# Patient Record
Sex: MALE | Race: WHITE | Employment: STUDENT | URBAN - METROPOLITAN AREA
[De-identification: names, ages, dates, MRNs, and addresses within clinical notes are randomized per-mention and may not be internally consistent; named-entity substitution may affect disease eponyms.]

---
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
## 2023-05-22 ENCOUNTER — OFFICE VISIT (OUTPATIENT)
Dept: URGENT CARE | Age: 18
End: 2023-05-22

## 2023-05-22 VITALS
HEART RATE: 70 BPM | RESPIRATION RATE: 18 BRPM | OXYGEN SATURATION: 99 % | TEMPERATURE: 97.8 F | SYSTOLIC BLOOD PRESSURE: 160 MMHG | DIASTOLIC BLOOD PRESSURE: 90 MMHG

## 2023-05-22 DIAGNOSIS — L23.7 POISON IVY: Primary | ICD-10-CM

## 2023-05-22 RX ORDER — PREDNISONE 10 MG/1
10 TABLET ORAL DAILY
Qty: 21 TABLET | Refills: 0 | Status: SHIPPED | OUTPATIENT
Start: 2023-05-22

## 2023-05-22 NOTE — PROGRESS NOTES
[facility] [employer] [facility]        NAME: Magui Shane is a 21 y o  male  : 2005    MRN: [id number]  DATE: May 22, 2023  TIME: 8:27 AM    Assessment and Plan   Poison ivy [L23 7]  1  Poison ivy  predniSONE 10 mg tablet            Patient Instructions   Prednisone taper as directed  Cool compressed to the area  Avoid scratching   Benadryl as needed for itching- may cause drowsiness  Follow up with your PCP as needed      Follow up with PCP in 3-5 days  Proceed to  ER if symptoms worsen  Chief Complaint     Chief Complaint   Patient presents with   • Poison Ivy     Patient states that he started last Tuesday with his poison ivy- now on arms legs and back areaa         History of Present Illness       Patient is a 21year old male presenting with poison ivy rash to arms, legs, lower back, and face  He states it has been there for 6 days  He does not feel it is improving overall  He states it was on his face and caused some difficulty breathing  He treated with benadryl and Mucinex  The rash on the face has improved  No longer having breathing difficulties  Review of Systems   Review of Systems   Constitutional: Negative for activity change, chills and fever  HENT: Negative for congestion, facial swelling, sore throat and trouble swallowing  Respiratory: Negative for cough and shortness of breath  Skin: Positive for rash           Current Medications       Current Outpatient Medications:   •  predniSONE 10 mg tablet, Take 1 tablet (10 mg total) by mouth daily 60mg days 1, 50mg day 2, 40mg day 3, 30mg day 4, 20 mg day 5, 10mg day 6 , Disp: 21 tablet, Rfl: 0    Current Allergies     Allergies as of 2023   • (No Known Allergies)            The following portions of the patient's history were reviewed and updated as appropriate: allergies, current medications, past family history, past medical history, past social history, past surgical history and problem list      Past Medical History:   Diagnosis Date   • Closed right ankle fracture 2017       No past surgical history on file  No family history on file  Medications have been verified  Objective   /90 (BP Location: Right arm, Patient Position: Sitting, Cuff Size: Standard)   Pulse 70   Temp 97 8 °F (36 6 °C)   Resp 18   SpO2 99%        Physical Exam     Physical Exam  Vitals reviewed  Constitutional:       General: He is awake  He is not in acute distress  Appearance: Normal appearance  He is normal weight  Cardiovascular:      Rate and Rhythm: Normal rate  Pulmonary:      Effort: Pulmonary effort is normal       Breath sounds: Normal breath sounds  No decreased breath sounds, wheezing, rhonchi or rales  Skin:     General: Skin is warm and moist       Findings: Rash present  Comments: Diffuse vesicular rash to arms, legs, and lower back  No areas of surrounding erythema or drainage    Neurological:      Mental Status: He is alert  Psychiatric:         Behavior: Behavior is cooperative

## 2023-07-11 ENCOUNTER — OFFICE VISIT (OUTPATIENT)
Dept: URGENT CARE | Age: 18
End: 2023-07-11
Payer: COMMERCIAL

## 2023-07-11 VITALS — OXYGEN SATURATION: 99 % | HEART RATE: 86 BPM | TEMPERATURE: 98.4 F | RESPIRATION RATE: 18 BRPM

## 2023-07-11 DIAGNOSIS — L23.7 POISON IVY: Primary | ICD-10-CM

## 2023-07-11 PROCEDURE — S9083 URGENT CARE CENTER GLOBAL: HCPCS | Performed by: PHYSICIAN ASSISTANT

## 2023-07-11 PROCEDURE — 96372 THER/PROPH/DIAG INJ SC/IM: CPT | Performed by: PHYSICIAN ASSISTANT

## 2023-07-11 PROCEDURE — G0382 LEV 3 HOSP TYPE B ED VISIT: HCPCS | Performed by: PHYSICIAN ASSISTANT

## 2023-07-11 RX ORDER — PREDNISONE 10 MG/1
TABLET ORAL
Qty: 21 TABLET | Refills: 0 | Status: SHIPPED | OUTPATIENT
Start: 2023-07-11

## 2023-07-11 RX ORDER — METHYLPREDNISOLONE ACETATE 40 MG/ML
40 INJECTION, SUSPENSION INTRA-ARTICULAR; INTRALESIONAL; INTRAMUSCULAR; SOFT TISSUE ONCE
Status: COMPLETED | OUTPATIENT
Start: 2023-07-11 | End: 2023-07-11

## 2023-07-11 RX ADMIN — METHYLPREDNISOLONE ACETATE 40 MG: 40 INJECTION, SUSPENSION INTRA-ARTICULAR; INTRALESIONAL; INTRAMUSCULAR; SOFT TISSUE at 19:00

## 2023-07-11 NOTE — PATIENT INSTRUCTIONS
Take the prednisone daily as directed    Avoid heat exposure    Continue local rash care as directed    Follow-up primary care physician if symptoms persist    Go to the emergency room if your symptoms are worsening

## 2023-07-12 NOTE — PROGRESS NOTES
[facility]        NAME: Gloria Odonnell is a 21 y.o. male  : 2005    MRN: [id number]  DATE: 2023  TIME: 8:15 PM    Assessment and Plan   Poison ivy [L23.7]  1. Poison ivy  methylPREDNISolone acetate (DEPO-MEDROL) injection 40 mg    predniSONE 10 mg tablet            Patient Instructions       Follow up with PCP in 3-5 days. Proceed to  ER if symptoms worsen. Chief Complaint     Chief Complaint   Patient presents with   • Rash     Patient believes he has poison ivy. He has a rash over his entire body and states that it started Thursday. History of Present Illness       Here for evaluation of a rash on his bilateral hands, arms, face, legs. Patient was pulling weeds and was exposed to poison ivy. He states the rash is very itchy. Rash        Review of Systems   Review of Systems   Constitutional: Negative. HENT: Negative. Respiratory: Negative. Musculoskeletal: Negative. Skin: Positive for rash. Negative for color change. Current Medications       Current Outpatient Medications:   •  predniSONE 10 mg tablet, Six tablets day 1; 5 tablets day 2; 4 tablets day 3; 3 tablets day 4; 2 tablets day 5; and 1 tablet day 6, Disp: 21 tablet, Rfl: 0  •  predniSONE 10 mg tablet, Take 1 tablet (10 mg total) by mouth daily 60mg days 1, 50mg day 2, 40mg day 3, 30mg day 4, 20 mg day 5, 10mg day 6.  (Patient not taking: Reported on 2023), Disp: 21 tablet, Rfl: 0    Current Facility-Administered Medications:   •  methylPREDNISolone acetate (DEPO-MEDROL) injection 40 mg, 40 mg, Intramuscular, Once, Harish Franco PA-C    Current Allergies     Allergies as of 2023   • (No Known Allergies)            The following portions of the patient's history were reviewed and updated as appropriate: allergies, current medications, past family history, past medical history, past social history, past surgical history and problem list.     Past Medical History:   Diagnosis Date • Closed right ankle fracture 2017       No past surgical history on file. No family history on file. Medications have been verified. Objective   Pulse 86   Temp 98.4 °F (36.9 °C)   Resp 18   SpO2 99%   No LMP for male patient. Physical Exam     Physical Exam  Vitals and nursing note reviewed. Constitutional:       General: He is not in acute distress. Appearance: Normal appearance. He is well-developed. He is not ill-appearing, toxic-appearing or diaphoretic. HENT:      Head: Normocephalic and atraumatic. Eyes:      Extraocular Movements: Extraocular movements intact. Conjunctiva/sclera: Conjunctivae normal.      Pupils: Pupils are equal, round, and reactive to light. Skin:     General: Skin is warm and dry. Comments: Maculopapular vesicular rash on the bilateral hands and upper extremities as well as the lower extremities. Patient also has the similar rash on his forehead. Rash on the upper extremities does have a linear and patchy appearance consistent with poison ivy. No pustules. No deep erythema. Neurological:      General: No focal deficit present. Mental Status: He is alert and oriented to person, place, and time. Psychiatric:         Mood and Affect: Mood normal.         Behavior: Behavior normal.         Thought Content:  Thought content normal.         Judgment: Judgment normal.

## 2023-08-30 ENCOUNTER — OFFICE VISIT (OUTPATIENT)
Dept: URGENT CARE | Age: 18
End: 2023-08-30
Payer: COMMERCIAL

## 2023-08-30 VITALS
OXYGEN SATURATION: 98 % | RESPIRATION RATE: 18 BRPM | SYSTOLIC BLOOD PRESSURE: 138 MMHG | TEMPERATURE: 98.6 F | DIASTOLIC BLOOD PRESSURE: 68 MMHG

## 2023-08-30 DIAGNOSIS — L23.7 POISON IVY DERMATITIS: Primary | ICD-10-CM

## 2023-08-30 PROCEDURE — 96372 THER/PROPH/DIAG INJ SC/IM: CPT

## 2023-08-30 PROCEDURE — S9083 URGENT CARE CENTER GLOBAL: HCPCS

## 2023-08-30 PROCEDURE — G0382 LEV 3 HOSP TYPE B ED VISIT: HCPCS

## 2023-08-30 RX ORDER — PREDNISONE 50 MG/1
50 TABLET ORAL DAILY
Qty: 3 TABLET | Refills: 0 | Status: SHIPPED | OUTPATIENT
Start: 2023-08-30 | End: 2023-09-02

## 2023-08-30 RX ORDER — METHYLPREDNISOLONE SODIUM SUCCINATE 40 MG/ML
40 INJECTION, POWDER, LYOPHILIZED, FOR SOLUTION INTRAMUSCULAR; INTRAVENOUS ONCE
Status: COMPLETED | OUTPATIENT
Start: 2023-08-30 | End: 2023-08-30

## 2023-08-30 RX ADMIN — METHYLPREDNISOLONE SODIUM SUCCINATE 40 MG: 40 INJECTION, POWDER, LYOPHILIZED, FOR SOLUTION INTRAMUSCULAR; INTRAVENOUS at 17:22

## 2023-08-30 NOTE — PROGRESS NOTES
[facility]        NAME: Preeti Greenberg is a 19 y.o. male  : 2005    MRN: [id number]  DATE: 2023  TIME: 5:22 PM    Assessment and Plan   Poison ivy dermatitis [L23.7]  1. Poison ivy dermatitis  methylPREDNISolone sodium succinate (Solu-MEDROL) injection 40 mg    predniSONE 50 mg tablet        Solumedrol injection given in office. Patient tolerated well with no immediate complications. Patient Instructions     Please take Prednisone started 2023. May take Benadryl as needed for itching,   If symptoms persist, please follow-up with PCP. Proceed to  ER if symptoms worsen. Chief Complaint     Chief Complaint   Patient presents with   • Rash     Poison ivy on hands,arms, chest for 1 week. Using ivarest otc. History of Present Illness       Patient presenting for evaluation of dermatitis. Patient states that he was exposed to poison ivy over 1 week ago, and has developed a pruritic vesicular type rash over his hands and arms in the past week. He states that he has been applying Ivarest to the area with minimal relief of his symptoms. He denies any recent fevers or chills, nausea, vomiting or diarrhea. Alleviating factors of his dermatitis. Review of Systems   Review of Systems   Constitutional: Negative for chills and fever. Respiratory: Negative for shortness of breath. Cardiovascular: Negative for chest pain. Skin: Positive for rash. All other systems reviewed and are negative. Current Medications       Current Outpatient Medications:   •  predniSONE 50 mg tablet, Take 1 tablet (50 mg total) by mouth daily for 3 days, Disp: 3 tablet, Rfl: 0  •  predniSONE 10 mg tablet, Take 1 tablet (10 mg total) by mouth daily 60mg days 1, 50mg day 2, 40mg day 3, 30mg day 4, 20 mg day 5, 10mg day 6.  (Patient not taking: Reported on 2023), Disp: 21 tablet, Rfl: 0  •  predniSONE 10 mg tablet, Six tablets day 1; 5 tablets day 2; 4 tablets day 3; 3 tablets day 4; 2 tablets day 5; and 1 tablet day 6 (Patient not taking: Reported on 8/30/2023), Disp: 21 tablet, Rfl: 0    Current Facility-Administered Medications:   •  methylPREDNISolone sodium succinate (Solu-MEDROL) injection 40 mg, 40 mg, Intramuscular, Once, Myrtice Arms, CRNP    Current Allergies     Allergies as of 08/30/2023   • (No Known Allergies)            The following portions of the patient's history were reviewed and updated as appropriate: allergies, current medications, past family history, past medical history, past social history, past surgical history and problem list.     Past Medical History:   Diagnosis Date   • Closed right ankle fracture 2017       History reviewed. No pertinent surgical history. History reviewed. No pertinent family history. Medications have been verified. Objective   /68   Temp 98.6 °F (37 °C) (Temporal)   Resp 18   SpO2 98%        Physical Exam     Physical Exam  Vitals and nursing note reviewed. Constitutional:       General: He is not in acute distress. Appearance: Normal appearance. He is not ill-appearing, toxic-appearing or diaphoretic. HENT:      Head: Normocephalic and atraumatic. Eyes:      General:         Right eye: No discharge. Left eye: No discharge. Cardiovascular:      Rate and Rhythm: Normal rate and regular rhythm. Pulses: Normal pulses. Heart sounds: Normal heart sounds. No murmur heard. No friction rub. No gallop. Pulmonary:      Effort: Pulmonary effort is normal. No respiratory distress. Breath sounds: Normal breath sounds. No stridor. No wheezing, rhonchi or rales. Chest:      Chest wall: No tenderness. Skin:     General: Skin is warm and dry. Findings: Rash (vesicular type rash b/l arms, hands and fingers ) present. Neurological:      Mental Status: He is alert.    Psychiatric:         Mood and Affect: Mood normal.         Behavior: Behavior normal.

## 2023-08-30 NOTE — PATIENT INSTRUCTIONS
Please take Prednisone started 8/31/2023. May take Benadryl as needed for itching,   If symptoms persist, please follow-up with PCP.

## 2023-10-27 ENCOUNTER — OFFICE VISIT (OUTPATIENT)
Dept: INTERNAL MEDICINE CLINIC | Facility: OTHER | Age: 18
End: 2023-10-27
Payer: COMMERCIAL

## 2023-10-27 VITALS
BODY MASS INDEX: 39.17 KG/M2 | TEMPERATURE: 98.4 F | HEIGHT: 75 IN | OXYGEN SATURATION: 99 % | DIASTOLIC BLOOD PRESSURE: 80 MMHG | HEART RATE: 77 BPM | WEIGHT: 315 LBS | SYSTOLIC BLOOD PRESSURE: 116 MMHG

## 2023-10-27 DIAGNOSIS — Z13.6 ENCOUNTER FOR LIPID SCREENING FOR CARDIOVASCULAR DISEASE: ICD-10-CM

## 2023-10-27 DIAGNOSIS — Z13.220 ENCOUNTER FOR LIPID SCREENING FOR CARDIOVASCULAR DISEASE: ICD-10-CM

## 2023-10-27 DIAGNOSIS — Z23 ENCOUNTER FOR IMMUNIZATION: ICD-10-CM

## 2023-10-27 DIAGNOSIS — R30.0 DYSURIA: Primary | ICD-10-CM

## 2023-10-27 DIAGNOSIS — Z11.3 SCREEN FOR STD (SEXUALLY TRANSMITTED DISEASE): ICD-10-CM

## 2023-10-27 PROBLEM — M53.3 COCCYDYNIA: Status: RESOLVED | Noted: 2022-12-23 | Resolved: 2023-10-27

## 2023-10-27 LAB
BACTERIA UR QL AUTO: ABNORMAL /HPF
BILIRUB UR QL STRIP: NEGATIVE
CLARITY UR: CLEAR
COLOR UR: COLORLESS
GLUCOSE UR STRIP-MCNC: NEGATIVE MG/DL
HGB UR QL STRIP.AUTO: NEGATIVE
KETONES UR STRIP-MCNC: NEGATIVE MG/DL
LEUKOCYTE ESTERASE UR QL STRIP: ABNORMAL
NITRITE UR QL STRIP: NEGATIVE
NON-SQ EPI CELLS URNS QL MICRO: ABNORMAL /HPF
PH UR STRIP.AUTO: 7 [PH]
PROT UR STRIP-MCNC: NEGATIVE MG/DL
RBC #/AREA URNS AUTO: ABNORMAL /HPF
SL AMB  POCT GLUCOSE, UA: NORMAL
SL AMB LEUKOCYTE ESTERASE,UA: NORMAL
SL AMB POCT BILIRUBIN,UA: NORMAL
SL AMB POCT BLOOD,UA: NORMAL
SL AMB POCT CLARITY,UA: CLEAR
SL AMB POCT COLOR,UA: NORMAL
SL AMB POCT KETONES,UA: NORMAL
SL AMB POCT NITRITE,UA: NORMAL
SL AMB POCT PH,UA: 7
SL AMB POCT SPECIFIC GRAVITY,UA: 1.01
SL AMB POCT URINE PROTEIN: NORMAL
SL AMB POCT UROBILINOGEN: 0.2
SP GR UR STRIP.AUTO: 1.01 (ref 1–1.03)
UROBILINOGEN UR STRIP-ACNC: <2 MG/DL
WBC #/AREA URNS AUTO: ABNORMAL /HPF

## 2023-10-27 PROCEDURE — 81003 URINALYSIS AUTO W/O SCOPE: CPT | Performed by: INTERNAL MEDICINE

## 2023-10-27 PROCEDURE — 90471 IMMUNIZATION ADMIN: CPT

## 2023-10-27 PROCEDURE — 90686 IIV4 VACC NO PRSV 0.5 ML IM: CPT

## 2023-10-27 PROCEDURE — 99203 OFFICE O/P NEW LOW 30 MIN: CPT | Performed by: INTERNAL MEDICINE

## 2023-10-27 PROCEDURE — 81001 URINALYSIS AUTO W/SCOPE: CPT | Performed by: INTERNAL MEDICINE

## 2023-10-27 PROCEDURE — 87086 URINE CULTURE/COLONY COUNT: CPT | Performed by: INTERNAL MEDICINE

## 2023-10-27 PROCEDURE — 87147 CULTURE TYPE IMMUNOLOGIC: CPT | Performed by: INTERNAL MEDICINE

## 2023-10-27 NOTE — ASSESSMENT & PLAN NOTE
Urine dip negative for UTI. We will send urine for urinalysis with microscopy and reflex to culture. We will also test for gonorrhea/chlamydia as well as doing STD screening. Referral to urology given.

## 2023-10-27 NOTE — PROGRESS NOTES
Assessment/Plan:    Dysuria  Urine dip negative for UTI. We will send urine for urinalysis with microscopy and reflex to culture. We will also test for gonorrhea/chlamydia as well as doing STD screening. Referral to urology given. Diagnoses and all orders for this visit:    Dysuria  -     POCT urine dip auto non-scope  -     UA w Reflex to Microscopic w Reflex to Culture - Clinic Collect  -     Chlamydia/GC amplified DNA by PCR; Future  -     Ambulatory Referral to Urology; Future  -     CBC; Future  -     Comprehensive metabolic panel; Future    Encounter for immunization  -     influenza vaccine, quadrivalent, 0.5 mL, preservative-free, for adult and pediatric patients 6 mos+ ([place], [address] [facility], [place], [address])    Encounter for lipid screening for cardiovascular disease  -     Lipid panel; Future    Screen for STD (sexually transmitted disease)  -     HIV 1/2 AG/AB w Reflex [facility] for 2 yr old and above; Future  -     RPR-Syphilis Screening (Total Syphilis IGG/IGM); Future          BMI Counseling: Body mass index is 40 kg/m². The BMI is above normal. Nutrition recommendations include decreasing portion sizes, encouraging healthy choices of fruits and vegetables, decreasing fast food intake, consuming healthier snacks, limiting drinks that contain sugar, moderation in carbohydrate intake, increasing intake of lean protein, reducing intake of saturated and trans fat and reducing intake of cholesterol. Exercise recommendations include moderate physical activity 150 minutes/week and exercising 3-5 times per week. No pharmacotherapy was ordered. Patient referred to PCP. Rationale for BMI follow-up plan is due to patient being overweight or obese. Depression Screening and Follow-up Plan: Patient was screened for depression during today's encounter. They screened negative with a PHQ-2 score of 0. Subjective:      Patient ID: Carmelo Martinez is a 19 y.o. male.     Chief Complaint   Patient presents with   • Difficulty Urinating     Painful urination for about 3 yrs. No hematuria, no odor. Strong pain at the tip of penis. No groin pain, no lower back pain. 19year old male is seen today with concern for chronic dysuria. Symptoms are exacerbated whenever she drinks anything that isn't water. Dysuria is localized to the tip of the penis only and occurs only with urination. He denies any hematuria. She is sexually active and uses barrier protection every time. He denies any penile lesions or discharge. POC urine dip today: small amount of leukocytes. Difficulty Urinating   This is a chronic problem. The problem occurs intermittently. Pertinent negatives include no chills, hematuria, nausea or vomiting. The following portions of the patient's history were reviewed and updated as appropriate: allergies, current medications, past family history, past medical history, past social history, past surgical history, and problem list.    Review of Systems   Constitutional:  Negative for activity change, appetite change, chills, diaphoresis, fatigue and fever. HENT:  Negative for congestion, postnasal drip, rhinorrhea, sinus pressure, sinus pain, sneezing and sore throat. Eyes:  Negative for visual disturbance. Respiratory:  Negative for apnea, cough, choking, chest tightness, shortness of breath and wheezing. Cardiovascular:  Negative for chest pain, palpitations and leg swelling. Gastrointestinal:  Negative for abdominal distention, abdominal pain, anal bleeding, blood in stool, constipation, diarrhea, nausea and vomiting. Endocrine: Negative for cold intolerance and heat intolerance. Genitourinary:  Positive for dysuria. Negative for difficulty urinating and hematuria. Musculoskeletal: Negative. Skin: Negative. Neurological:  Negative for dizziness, weakness, light-headedness, numbness and headaches. Hematological:  Negative for adenopathy.    Psychiatric/Behavioral: Negative for agitation, sleep disturbance and suicidal ideas. All other systems reviewed and are negative. Past Medical History:   Diagnosis Date   • Closed right ankle fracture 2017       No current outpatient medications on file. No Known Allergies    Social History   History reviewed. No pertinent surgical history. History reviewed. No pertinent family history. Objective:  /80 (BP Location: Left arm, Patient Position: Sitting, Cuff Size: Large)   Pulse 77   Temp 98.4 °F (36.9 °C) (Temporal)   Ht 6' 3" (1.905 m)   Wt (!) 145 kg (320 lb)   SpO2 99% Comment: ra  BMI 40.00 kg/m²     Recent Results (from the past 1344 hour(s))   POCT urine dip auto non-scope    Collection Time: 10/27/23 10:45 AM   Result Value Ref Range     COLOR,UA light yellow     CLARITY,UA clear     SPECIFIC GRAVITY,UA 1.015      PH,UA 7.0     LEUKOCYTE ESTERASE,UA small     NITRITE,UA neg     GLUCOSE, UA neg     KETONES,UA neg     BILIRUBIN,UA neg     BLOOD,UA neg     POCT URINE PROTEIN neg     SL AMB POCT UROBILINOGEN 0.2             Physical Exam  Vitals and nursing note reviewed. Constitutional:       General: He is not in acute distress. Appearance: He is well-developed. He is not diaphoretic. HENT:      Head: Normocephalic and atraumatic. Eyes:      General: No scleral icterus. Right eye: No discharge. Left eye: No discharge. Conjunctiva/sclera: Conjunctivae normal.      Pupils: Pupils are equal, round, and reactive to light. Neck:      Thyroid: No thyromegaly. Vascular: No JVD. Cardiovascular:      Rate and Rhythm: Normal rate and regular rhythm. Heart sounds: Normal heart sounds. No murmur heard. No friction rub. No gallop. Pulmonary:      Effort: Pulmonary effort is normal. No respiratory distress. Breath sounds: Normal breath sounds. No wheezing or rales. Chest:      Chest wall: No tenderness.    Abdominal:      General: Bowel sounds are normal. There is no distension. Palpations: Abdomen is soft. There is no mass. Tenderness: There is no abdominal tenderness. There is no guarding or rebound. Musculoskeletal:         General: No tenderness or deformity. Normal range of motion. Cervical back: Normal range of motion and neck supple. Lymphadenopathy:      Cervical: No cervical adenopathy. Skin:     General: Skin is warm and dry. Coloration: Skin is not pale. Findings: No erythema or rash. Neurological:      Mental Status: He is alert and oriented to person, place, and time. Cranial Nerves: No cranial nerve deficit. Coordination: Coordination normal.      Deep Tendon Reflexes: Reflexes are normal and symmetric. Psychiatric:         Behavior: Behavior normal.         Thought Content:  Thought content normal.         Judgment: Judgment normal.

## 2023-10-28 LAB — BACTERIA UR CULT: ABNORMAL

## 2023-10-30 DIAGNOSIS — A49.1 GBS (GROUP B STREPTOCOCCUS) INFECTION: Primary | ICD-10-CM

## 2023-10-30 RX ORDER — AMOXICILLIN AND CLAVULANATE POTASSIUM 875; 125 MG/1; MG/1
1 TABLET, FILM COATED ORAL EVERY 12 HOURS SCHEDULED
Qty: 20 TABLET | Refills: 0 | Status: SHIPPED | OUTPATIENT
Start: 2023-10-30 | End: 2023-11-09

## 2023-11-04 ENCOUNTER — APPOINTMENT (OUTPATIENT)
Dept: LAB | Facility: IMAGING CENTER | Age: 18
End: 2023-11-04
Payer: COMMERCIAL

## 2023-11-04 DIAGNOSIS — Z11.3 SCREEN FOR STD (SEXUALLY TRANSMITTED DISEASE): ICD-10-CM

## 2023-11-04 DIAGNOSIS — R30.0 DYSURIA: ICD-10-CM

## 2023-11-04 PROCEDURE — 87389 HIV-1 AG W/HIV-1&-2 AB AG IA: CPT

## 2023-11-04 PROCEDURE — 86780 TREPONEMA PALLIDUM: CPT

## 2023-11-04 PROCEDURE — 36415 COLL VENOUS BLD VENIPUNCTURE: CPT

## 2023-11-05 LAB — TREPONEMA PALLIDUM IGG+IGM AB [PRESENCE] IN SERUM OR PLASMA BY IMMUNOASSAY: NORMAL

## 2023-11-06 ENCOUNTER — APPOINTMENT (OUTPATIENT)
Dept: LAB | Facility: IMAGING CENTER | Age: 18
End: 2023-11-06
Payer: COMMERCIAL

## 2023-11-06 DIAGNOSIS — R30.0 DYSURIA: ICD-10-CM

## 2023-11-06 LAB
HIV 1+2 AB+HIV1 P24 AG SERPL QL IA: NORMAL
HIV 2 AB SERPL QL IA: NORMAL
HIV1 AB SERPL QL IA: NORMAL
HIV1 P24 AG SERPL QL IA: NORMAL

## 2023-11-06 PROCEDURE — 87491 CHLMYD TRACH DNA AMP PROBE: CPT

## 2023-11-06 PROCEDURE — 87591 N.GONORRHOEAE DNA AMP PROB: CPT

## 2023-11-07 LAB
C TRACH DNA SPEC QL NAA+PROBE: NEGATIVE
N GONORRHOEA DNA SPEC QL NAA+PROBE: NEGATIVE

## 2024-01-29 ENCOUNTER — OFFICE VISIT (OUTPATIENT)
Dept: INTERNAL MEDICINE CLINIC | Facility: OTHER | Age: 19
End: 2024-01-29
Payer: COMMERCIAL

## 2024-01-29 VITALS
OXYGEN SATURATION: 99 % | HEIGHT: 75 IN | DIASTOLIC BLOOD PRESSURE: 70 MMHG | WEIGHT: 315 LBS | TEMPERATURE: 98 F | BODY MASS INDEX: 39.17 KG/M2 | SYSTOLIC BLOOD PRESSURE: 110 MMHG | HEART RATE: 87 BPM

## 2024-01-29 DIAGNOSIS — R31.0 GROSS HEMATURIA: ICD-10-CM

## 2024-01-29 DIAGNOSIS — Z00.00 ANNUAL PHYSICAL EXAM: Primary | ICD-10-CM

## 2024-01-29 DIAGNOSIS — R30.0 DYSURIA: ICD-10-CM

## 2024-01-29 PROCEDURE — 99395 PREV VISIT EST AGE 18-39: CPT | Performed by: INTERNAL MEDICINE

## 2024-01-29 NOTE — PROGRESS NOTES
ADULT ANNUAL PHYSICAL  [facility] [place]    NAME: Williams Calvo  AGE: 18 y.o. SEX: male  : 2005     DATE: 2024     Assessment and Plan:     Problem List Items Addressed This Visit        Genitourinary    Gross hematuria     He has an appt with urology. Will order US kidneys and bladder and UA.          Relevant Orders    US kidney and bladder    UA w Reflex to Microscopic w Reflex to Culture       Other    Dysuria    Relevant Orders    US kidney and bladder    UA w Reflex to Microscopic w Reflex to Culture    Annual physical exam - Primary     Discussed diet and exercise.   He is up to date on immunizations.   Discussed safe sex practice.             Immunizations and preventive care screenings were discussed with patient today. Appropriate education was printed on patient's after visit summary.    Counseling:  Alcohol/drug use: discussed moderation in alcohol intake, the recommendations for healthy alcohol use, and avoidance of illicit drug use.  Dental Health: discussed importance of regular tooth brushing, flossing, and dental visits.  Injury prevention: discussed safety/seat belts, safety helmets, smoke detectors, carbon dioxide detectors, and smoking near bedding or upholstery.  Sexual health: discussed sexually transmitted diseases, partner selection, use of condoms, avoidance of unintended pregnancy, and contraceptive alternatives.  Exercise: the importance of regular exercise/physical activity was discussed. Recommend exercise 3-5 times per week for at least 30 minutes.          Return in about 1 year (around 2025) for Annual physical.     Chief Complaint:     Chief Complaint   Patient presents with   • Follow-up     3 month      History of Present Illness:     Adult Annual Physical   Patient here for a comprehensive physical exam. The patient reports problems -    Dysuria: He continues to have dysuria and had an episode  of gross hematuria. The hematuria occurred after drinking alcohol and has not recurred. He never had an episode of hematuria prior or since.     Diet and Physical Activity  Diet/Nutrition: poor diet.   Exercise: no formal exercise.      Depression Screening  PHQ-2/9 Depression Screening         General Health  Sleep: sleeps well and gets 7-8 hours of sleep on average.   Hearing: normal - bilateral.  Vision: no vision problems.   Dental: no dental visits for >1 year, brushes teeth twice daily, and flosses teeth occasionally.        Health  History of STDs?: no.       Review of Systems:     Review of Systems   Constitutional:  Negative for activity change, appetite change, chills, diaphoresis, fatigue and fever.   HENT:  Negative for congestion, postnasal drip, rhinorrhea, sinus pressure, sinus pain, sneezing and sore throat.    Eyes:  Negative for visual disturbance.   Respiratory:  Negative for apnea, cough, choking, chest tightness, shortness of breath and wheezing.    Cardiovascular:  Negative for chest pain, palpitations and leg swelling.   Gastrointestinal:  Negative for abdominal distention, abdominal pain, anal bleeding, blood in stool, constipation, diarrhea, nausea and vomiting.   Endocrine: Negative for cold intolerance and heat intolerance.   Genitourinary:  Negative for difficulty urinating, dysuria and hematuria.   Musculoskeletal: Negative.    Skin: Negative.    Neurological:  Negative for dizziness, weakness, light-headedness, numbness and headaches.   Hematological:  Negative for adenopathy.   Psychiatric/Behavioral:  Negative for agitation, sleep disturbance and suicidal ideas.    All other systems reviewed and are negative.     Past Medical History:     Past Medical History:   Diagnosis Date   • Closed right ankle fracture 2017      Past Surgical History:     History reviewed. No pertinent surgical history.   Social History:     Social History     Socioeconomic History   • Marital status: Single  "    Spouse name: None   • Number of children: None   • Years of education: None   • Highest education level: None   Occupational History   • None   Tobacco Use   • Smoking status: Never   • Smokeless tobacco: Never   Vaping Use   • Vaping status: Never Used   Substance and Sexual Activity   • Alcohol use: Never   • Drug use: Never   • Sexual activity: Not Currently   Other Topics Concern   • None   Social History Narrative   • None     Social Determinants of Health     Financial Resource Strain: Not on file   Food Insecurity: Not on file   Transportation Needs: Not on file   Physical Activity: Not on file   Stress: Not on file   Social Connections: Not on file   Intimate Partner Violence: Not on file   Housing Stability: Not on file      Family History:     History reviewed. No pertinent family history.   Current Medications:     No current outpatient medications on file.     No current facility-administered medications for this visit.      Allergies:     No Known Allergies   Physical Exam:     /70 (BP Location: Left arm, Patient Position: Sitting, Cuff Size: Large)   Pulse 87   Temp 98 °F (36.7 °C) (Temporal)   Ht 6' 3\" (1.905 m)   Wt (!) 152 kg (335 lb)   SpO2 99%   BMI 41.87 kg/m²     Physical Exam  Vitals and nursing note reviewed.   Constitutional:       General: He is not in acute distress.     Appearance: Normal appearance. He is normal weight. He is not ill-appearing, toxic-appearing or diaphoretic.   HENT:      Head: Normocephalic and atraumatic.      Right Ear: Tympanic membrane, ear canal and external ear normal. There is no impacted cerumen.      Left Ear: Tympanic membrane, ear canal and external ear normal. There is no impacted cerumen.      Nose: Nose normal. No congestion or rhinorrhea.      Mouth/Throat:      Mouth: Mucous membranes are moist.      Pharynx: Oropharynx is clear. No oropharyngeal exudate or posterior oropharyngeal erythema.   Eyes:      General: No scleral icterus.        " Right eye: No discharge.         Left eye: No discharge.      Extraocular Movements: Extraocular movements intact.      Conjunctiva/sclera: Conjunctivae normal.      Pupils: Pupils are equal, round, and reactive to light.   Neck:      Vascular: No carotid bruit.   Cardiovascular:      Rate and Rhythm: Normal rate and regular rhythm.      Pulses: Normal pulses.      Heart sounds: Normal heart sounds. No murmur heard.     No friction rub. No gallop.   Pulmonary:      Effort: Pulmonary effort is normal. No respiratory distress.      Breath sounds: Normal breath sounds. No wheezing or rales.   Abdominal:      General: Abdomen is flat. Bowel sounds are normal. There is no distension.      Palpations: Abdomen is soft. There is no mass.      Tenderness: There is no abdominal tenderness. There is no guarding.      Hernia: No hernia is present.   Musculoskeletal:         General: No swelling, tenderness, deformity or signs of injury. Normal range of motion.      Cervical back: Normal range of motion and neck supple. No rigidity. No muscular tenderness.      Right lower leg: No edema.      Left lower leg: No edema.   Lymphadenopathy:      Cervical: No cervical adenopathy.   Skin:     General: Skin is warm and dry.      Capillary Refill: Capillary refill takes less than 2 seconds.      Coloration: Skin is not jaundiced or pale.      Findings: No bruising, erythema, lesion or rash.   Neurological:      General: No focal deficit present.      Mental Status: He is alert and oriented to person, place, and time. Mental status is at baseline.      Cranial Nerves: No cranial nerve deficit.      Sensory: No sensory deficit.      Motor: No weakness.      Coordination: Coordination normal.      Gait: Gait normal.      Deep Tendon Reflexes: Reflexes normal.   Psychiatric:         Mood and Affect: Mood normal.         Behavior: Behavior normal.         Thought Content: Thought content normal.         Judgment: Judgment normal.           Chi Rios MD   [facility]

## 2024-01-31 ENCOUNTER — HOSPITAL ENCOUNTER (OUTPATIENT)
Dept: RADIOLOGY | Facility: IMAGING CENTER | Age: 19
Discharge: HOME | End: 2024-01-31
Payer: COMMERCIAL

## 2024-01-31 DIAGNOSIS — R31.0 GROSS HEMATURIA: ICD-10-CM

## 2024-01-31 DIAGNOSIS — R30.0 DYSURIA: ICD-10-CM

## 2024-01-31 PROCEDURE — 76775 US EXAM ABDO BACK WALL LIM: CPT

## 2024-02-20 NOTE — PROGRESS NOTES
2/21/2024      Chief Complaint   Patient presents with    Difficulty Urinating     PT unable to give Urine sample. PT Urinated around 6:30 am PTV         Assessment and Plan    18 y.o. male -- New patient    1. Dysuria  2. Gross hematuria   - US kidney and bladder (1/31/24) showing cyst. No other  findings.   - UA unable to be obtained  - PVR today  63 mL  - Discussed conservative measures with adequate hydration, avoidance of bladder irritants, avoidance of constipation, daily cranberry, daily probiotics  - Urine cytology, BMP, CT renal protocol ordered  - Return for cystoscopy  - Call with any questions or concerns in the meantime  - All questions answered; patient understands and agrees with plan       History of Present Illness  Williams Calvo is a 18 y.o. male new patient here for evaluation of dysuria.     Denies seeing urology in the past. Patient states he has had dysuria for the past  5 years. States he also has recurrent gross hematuria at least a few times a month. US recently negative. STD testing negative in November. Denies exposures since. Urine culture in October positive for Group B strep. Denies recurrent UTI, hospitalizations, family history of  malignancies. Does have split stream.     Review of Systems   Constitutional:  Negative for activity change, appetite change, chills and fever.   HENT:  Negative for congestion and trouble swallowing.    Respiratory:  Negative for cough and shortness of breath.    Cardiovascular:  Negative for chest pain, palpitations and leg swelling.   Gastrointestinal:  Negative for abdominal pain, constipation, diarrhea, nausea and vomiting.   Genitourinary:  Negative for difficulty urinating, dysuria, flank pain, frequency, hematuria and urgency.   Musculoskeletal:  Negative for back pain and gait problem.   Skin:  Negative for wound.   Allergic/Immunologic: Negative for immunocompromised state.   Neurological:  Negative for dizziness and syncope.   Hematological:   "Does not bruise/bleed easily.   Psychiatric/Behavioral:  Negative for confusion.    All other systems reviewed and are negative.       Vitals  Vitals:    02/21/24 0813   BP: 136/88   Pulse: 81   SpO2: 98%   Weight: (!) 150 kg (330 lb)   Height: 6' 3\" (1.905 m)       Physical Exam  Constitutional:       General: He is not in acute distress.     Appearance: Normal appearance. He is not ill-appearing, toxic-appearing or diaphoretic.   HENT:      Head: Normocephalic.      Nose: No congestion.   Eyes:      General: No scleral icterus.        Right eye: No discharge.         Left eye: No discharge.      Conjunctiva/sclera: Conjunctivae normal.      Pupils: Pupils are equal, round, and reactive to light.   Pulmonary:      Effort: Pulmonary effort is normal.   Genitourinary:     Comments: JOSELIN declined  Musculoskeletal:      Cervical back: Normal range of motion.   Skin:     General: Skin is warm and dry.      Coloration: Skin is not jaundiced or pale.      Findings: No bruising, erythema, lesion or rash.   Neurological:      General: No focal deficit present.      Mental Status: He is alert and oriented to person, place, and time. Mental status is at baseline.      Gait: Gait normal.   Psychiatric:         Mood and Affect: Mood normal.         Behavior: Behavior normal.         Thought Content: Thought content normal.         Judgment: Judgment normal.           Past History  Past Medical History:   Diagnosis Date    Closed right ankle fracture 2017     Social History     Socioeconomic History    Marital status: Single     Spouse name: None    Number of children: None    Years of education: None    Highest education level: None   Occupational History    None   Tobacco Use    Smoking status: Never     Passive exposure: Never    Smokeless tobacco: Never   Vaping Use    Vaping status: Never Used   Substance and Sexual Activity    Alcohol use: Yes     Comment: Occ ONLY    Drug use: Never    Sexual activity: Yes     Partners: " "Female     Birth control/protection: Condom Male   Other Topics Concern    None   Social History Narrative    None     Social Determinants of Health     Financial Resource Strain: Not on file   Food Insecurity: Not on file   Transportation Needs: Not on file   Physical Activity: Not on file   Stress: Not on file   Social Connections: Not on file   Intimate Partner Violence: Not on file   Housing Stability: Not on file     Social History     Tobacco Use   Smoking Status Never    Passive exposure: Never   Smokeless Tobacco Never     Family History   Problem Relation Age of Onset    Diabetes Father     No Known Problems Mother     No Known Problems Sister     No Known Problems Sister        The following portions of the patient's history were reviewed and updated as appropriate: allergies, current medications, past medical history, past social history, past surgical history and problem list.    Results  Recent Results (from the past 1 hour(s))   POCT Measure PVR    Collection Time: 02/21/24  8:14 AM   Result Value Ref Range    POST-VOID RESIDUAL VOLUME, ML POC 63 mL   ]  No results found for: \"PSA\"  Lab Results   Component Value Date    CALCIUM 8.7 01/30/2018    K 3.7 01/30/2018    CO2 29 01/30/2018     01/30/2018    BUN 8 01/30/2018    CREATININE 0.58 (L) 01/30/2018     Lab Results   Component Value Date    WBC 4.34 (L) 01/30/2018    HGB 12.8 01/30/2018    HCT 40.7 01/30/2018    MCV 76 (L) 01/30/2018     01/30/2018       Natalie Daniel PA-C  "

## 2024-02-21 ENCOUNTER — APPOINTMENT (OUTPATIENT)
Dept: LAB | Facility: AMBULARY SURGERY CENTER | Age: 19
End: 2024-02-21
Payer: COMMERCIAL

## 2024-02-21 ENCOUNTER — CONSULT (OUTPATIENT)
Dept: UROLOGY | Facility: CLINIC | Age: 19
End: 2024-02-21
Payer: COMMERCIAL

## 2024-02-21 VITALS
OXYGEN SATURATION: 98 % | BODY MASS INDEX: 39.17 KG/M2 | DIASTOLIC BLOOD PRESSURE: 88 MMHG | WEIGHT: 315 LBS | HEIGHT: 75 IN | SYSTOLIC BLOOD PRESSURE: 136 MMHG | HEART RATE: 81 BPM

## 2024-02-21 DIAGNOSIS — R31.0 GROSS HEMATURIA: ICD-10-CM

## 2024-02-21 DIAGNOSIS — R30.0 DYSURIA: Primary | ICD-10-CM

## 2024-02-21 DIAGNOSIS — R30.0 DYSURIA: ICD-10-CM

## 2024-02-21 LAB
ANION GAP SERPL CALCULATED.3IONS-SCNC: 7 MMOL/L
BUN SERPL-MCNC: 10 MG/DL (ref 5–25)
CALCIUM SERPL-MCNC: 9.4 MG/DL (ref 8.4–10.2)
CHLORIDE SERPL-SCNC: 103 MMOL/L (ref 96–108)
CO2 SERPL-SCNC: 29 MMOL/L (ref 21–32)
CREAT SERPL-MCNC: 0.66 MG/DL (ref 0.6–1.3)
GFR SERPL CREATININE-BSD FRML MDRD: 139 ML/MIN/1.73SQ M
GLUCOSE P FAST SERPL-MCNC: 74 MG/DL (ref 65–99)
POST-VOID RESIDUAL VOLUME, ML POC: 63 ML
POTASSIUM SERPL-SCNC: 4.1 MMOL/L (ref 3.5–5.3)
SODIUM SERPL-SCNC: 139 MMOL/L (ref 135–147)

## 2024-02-21 PROCEDURE — 36415 COLL VENOUS BLD VENIPUNCTURE: CPT

## 2024-02-21 PROCEDURE — 51798 US URINE CAPACITY MEASURE: CPT | Performed by: PHYSICIAN ASSISTANT

## 2024-02-21 PROCEDURE — 99204 OFFICE O/P NEW MOD 45 MIN: CPT | Performed by: PHYSICIAN ASSISTANT

## 2024-02-21 PROCEDURE — 80048 BASIC METABOLIC PNL TOTAL CA: CPT

## 2024-02-28 ENCOUNTER — HOSPITAL ENCOUNTER (OUTPATIENT)
Dept: RADIOLOGY | Facility: IMAGING CENTER | Age: 19
Discharge: HOME | End: 2024-02-28
Payer: COMMERCIAL

## 2024-02-28 ENCOUNTER — APPOINTMENT (OUTPATIENT)
Dept: LAB | Facility: IMAGING CENTER | Age: 19
End: 2024-02-28
Payer: COMMERCIAL

## 2024-02-28 DIAGNOSIS — R30.0 DYSURIA: ICD-10-CM

## 2024-02-28 DIAGNOSIS — R31.0 GROSS HEMATURIA: ICD-10-CM

## 2024-02-28 LAB
BACTERIA UR QL AUTO: ABNORMAL /HPF
BILIRUB UR QL STRIP: NEGATIVE
CLARITY UR: CLEAR
COLOR UR: COLORLESS
GLUCOSE UR STRIP-MCNC: NEGATIVE MG/DL
HGB UR QL STRIP.AUTO: NEGATIVE
KETONES UR STRIP-MCNC: NEGATIVE MG/DL
LEUKOCYTE ESTERASE UR QL STRIP: ABNORMAL
MUCOUS THREADS UR QL AUTO: ABNORMAL
NITRITE UR QL STRIP: NEGATIVE
NON-SQ EPI CELLS URNS QL MICRO: ABNORMAL /HPF
PH UR STRIP.AUTO: 6.5 [PH]
PROT UR STRIP-MCNC: ABNORMAL MG/DL
RBC #/AREA URNS AUTO: ABNORMAL /HPF
SP GR UR STRIP.AUTO: 1.04 (ref 1–1.03)
UROBILINOGEN UR STRIP-ACNC: <2 MG/DL
WBC #/AREA URNS AUTO: ABNORMAL /HPF

## 2024-02-28 PROCEDURE — 87086 URINE CULTURE/COLONY COUNT: CPT

## 2024-02-28 PROCEDURE — G1004 CDSM NDSC: HCPCS

## 2024-02-28 PROCEDURE — 81001 URINALYSIS AUTO W/SCOPE: CPT

## 2024-02-28 PROCEDURE — 74178 CT ABD&PLV WO CNTR FLWD CNTR: CPT

## 2024-02-28 RX ADMIN — IOHEXOL 100 ML: 350 INJECTION, SOLUTION INTRAVENOUS at 08:34

## 2024-02-29 ENCOUNTER — TELEPHONE (OUTPATIENT)
Dept: UROLOGY | Facility: CLINIC | Age: 19
End: 2024-02-29

## 2024-02-29 NOTE — TELEPHONE ENCOUNTER
Called and spoke to the PT with results and Natalie AYALA recommendations. PT verbalized understanding.    ----- Message from Natalie Daniel PA-C sent at 2/29/2024  1:46 PM EST -----  No acute  findings. Patient to follow up with MD as scheduled for cystoscopy.

## 2024-03-01 LAB — BACTERIA UR CULT: NORMAL

## 2024-04-27 ENCOUNTER — APPOINTMENT (OUTPATIENT)
Dept: LAB | Facility: IMAGING CENTER | Age: 19
End: 2024-04-27
Payer: COMMERCIAL

## 2024-04-27 DIAGNOSIS — R30.0 DYSURIA: ICD-10-CM

## 2024-04-27 DIAGNOSIS — Z13.6 ENCOUNTER FOR LIPID SCREENING FOR CARDIOVASCULAR DISEASE: ICD-10-CM

## 2024-04-27 DIAGNOSIS — Z13.220 ENCOUNTER FOR LIPID SCREENING FOR CARDIOVASCULAR DISEASE: ICD-10-CM

## 2024-04-27 LAB
ALBUMIN SERPL BCP-MCNC: 4.3 G/DL (ref 3.5–5)
ALP SERPL-CCNC: 72 U/L (ref 34–104)
ALT SERPL W P-5'-P-CCNC: 26 U/L (ref 7–52)
ANION GAP SERPL CALCULATED.3IONS-SCNC: 7 MMOL/L (ref 4–13)
AST SERPL W P-5'-P-CCNC: 23 U/L (ref 13–39)
BILIRUB SERPL-MCNC: 0.28 MG/DL (ref 0.2–1)
BUN SERPL-MCNC: 15 MG/DL (ref 5–25)
CALCIUM SERPL-MCNC: 9.1 MG/DL (ref 8.4–10.2)
CHLORIDE SERPL-SCNC: 105 MMOL/L (ref 96–108)
CHOLEST SERPL-MCNC: 144 MG/DL
CO2 SERPL-SCNC: 27 MMOL/L (ref 21–32)
CREAT SERPL-MCNC: 0.74 MG/DL (ref 0.6–1.3)
ERYTHROCYTE [DISTWIDTH] IN BLOOD BY AUTOMATED COUNT: 14.1 % (ref 11.6–15.1)
GFR SERPL CREATININE-BSD FRML MDRD: 132 ML/MIN/1.73SQ M
GLUCOSE P FAST SERPL-MCNC: 92 MG/DL (ref 65–99)
HCT VFR BLD AUTO: 45 % (ref 36.5–49.3)
HDLC SERPL-MCNC: 48 MG/DL
HGB BLD-MCNC: 13.6 G/DL (ref 12–17)
LDLC SERPL CALC-MCNC: 86 MG/DL (ref 0–100)
MCH RBC QN AUTO: 26.5 PG (ref 26.8–34.3)
MCHC RBC AUTO-ENTMCNC: 30.2 G/DL (ref 31.4–37.4)
MCV RBC AUTO: 88 FL (ref 82–98)
NONHDLC SERPL-MCNC: 96 MG/DL
PLATELET # BLD AUTO: 336 THOUSANDS/UL (ref 149–390)
PMV BLD AUTO: 10.5 FL (ref 8.9–12.7)
POTASSIUM SERPL-SCNC: 4.2 MMOL/L (ref 3.5–5.3)
PROT SERPL-MCNC: 7.2 G/DL (ref 6.4–8.4)
RBC # BLD AUTO: 5.13 MILLION/UL (ref 3.88–5.62)
SODIUM SERPL-SCNC: 139 MMOL/L (ref 135–147)
TRIGL SERPL-MCNC: 50 MG/DL
WBC # BLD AUTO: 5.81 THOUSAND/UL (ref 4.31–10.16)

## 2024-04-27 PROCEDURE — 85027 COMPLETE CBC AUTOMATED: CPT

## 2024-04-27 PROCEDURE — 80053 COMPREHEN METABOLIC PANEL: CPT

## 2024-04-27 PROCEDURE — 80061 LIPID PANEL: CPT

## 2024-04-27 PROCEDURE — 36415 COLL VENOUS BLD VENIPUNCTURE: CPT

## 2024-05-24 ENCOUNTER — OFFICE VISIT (OUTPATIENT)
Dept: URGENT CARE | Age: 19
End: 2024-05-24
Payer: COMMERCIAL

## 2024-05-24 VITALS
SYSTOLIC BLOOD PRESSURE: 143 MMHG | TEMPERATURE: 98.2 F | HEART RATE: 69 BPM | RESPIRATION RATE: 16 BRPM | WEIGHT: 315 LBS | DIASTOLIC BLOOD PRESSURE: 94 MMHG | OXYGEN SATURATION: 99 % | HEIGHT: 75 IN | BODY MASS INDEX: 39.17 KG/M2

## 2024-05-24 DIAGNOSIS — L23.7 POISON IVY DERMATITIS: Primary | ICD-10-CM

## 2024-05-24 PROCEDURE — G0382 LEV 3 HOSP TYPE B ED VISIT: HCPCS

## 2024-05-24 PROCEDURE — S9083 URGENT CARE CENTER GLOBAL: HCPCS

## 2024-05-24 RX ORDER — PREDNISONE 10 MG/1
TABLET ORAL
Qty: 21 TABLET | Refills: 0 | Status: SHIPPED | OUTPATIENT
Start: 2024-05-24

## 2024-05-24 RX ORDER — METHYLPREDNISOLONE ACETATE 40 MG/ML
40 INJECTION, SUSPENSION INTRA-ARTICULAR; INTRALESIONAL; INTRAMUSCULAR; SOFT TISSUE ONCE
Status: COMPLETED | OUTPATIENT
Start: 2024-05-24 | End: 2024-05-24

## 2024-05-24 RX ADMIN — METHYLPREDNISOLONE ACETATE 40 MG: 40 INJECTION, SUSPENSION INTRA-ARTICULAR; INTRALESIONAL; INTRAMUSCULAR; SOFT TISSUE at 19:22

## 2024-05-24 NOTE — PATIENT INSTRUCTIONS
You were given a shot of steroids in the office due to the poison ivy rash to your genital area.    Start the prednisone taper tomorrow.  Do not take any additional motrin/ibuprofen/advil while on the steroids.  You can take additional tylenol for additional pain or fever.   Keep area clean and dry.  Monitor for signs of infection including worsening rash, fever or chills.  If you develop any of these, high prescription emergency department to be reevaluated.    Consider trial Zanfel anti-poison ivy cream as directed.

## 2024-05-24 NOTE — PROGRESS NOTES
[facility]        NAME: Williams Calvo is a 18 y.o. male  : 2005    MRN: [id number]  DATE: May 24, 2024  TIME: 7:25 PM    Assessment and Plan   Poison ivy dermatitis [L23.7]  1. Poison ivy dermatitis  predniSONE 10 mg tablet    methylPREDNISolone acetate (DEPO-MEDROL) injection 40 mg        You were given a shot of steroids in the office due to the poison ivy rash to your genital area.    Start the prednisone taper tomorrow.  Do not take any additional motrin/ibuprofen/advil while on the steroids.  You can take additional tylenol for additional pain or fever.   Keep area clean and dry.  Monitor for signs of infection including worsening rash, fever or chills.  If you develop any of these, high prescription emergency department to be reevaluated.    Consider trial Zanfel anti-poison ivy cream as directed.     Patient Instructions       Follow up with PCP in 3-5 days.  Proceed to  ER if symptoms worsen.    If tests have been performed at [facility], our office will contact you with results if changes need to be made to the care plan discussed with you at the visit.  You can review your full results on [facility].    Chief Complaint     Chief Complaint   Patient presents with   • Poison Ivy     Per patient this started on Monday. He has poison ivy on his bilateral arms, chest and genitals.          History of Present Illness       Is a 18-year-old male presenting with poison ivy rash to bilateral upper arms, chest, and genital area.  He reports working as a , with frequent exposures to poison ivy.  He states that he used over-the-counter hydrocortisone cream without relief.  Concerns today due to poison ivy in the groin area.    Poison Ivy        Review of Systems   Review of Systems   Skin:  Positive for rash.         Current Medications       Current Outpatient Medications:   •  predniSONE 10 mg tablet, 6 Tabs day 1, 5 tabs day 2, 4 tabs day 3, 3 tabs day 4, 2 tabs day 5, 1 tab day 6,  "Disp: 21 tablet, Rfl: 0    Current Facility-Administered Medications:   •  methylPREDNISolone acetate (DEPO-MEDROL) injection 40 mg, 40 mg, Intramuscular, Once,     Current Allergies     Allergies as of 05/24/2024   • (No Known Allergies)            The following portions of the patient's history were reviewed and updated as appropriate: allergies, current medications, past family history, past medical history, past social history, past surgical history and problem list.     Past Medical History:   Diagnosis Date   • Closed right ankle fracture 2017       History reviewed. No pertinent surgical history.    Family History   Problem Relation Age of Onset   • Diabetes Father    • No Known Problems Mother    • No Known Problems Sister    • No Known Problems Sister          Medications have been verified.        Objective   /94 (BP Location: Left arm, Patient Position: Sitting)   Pulse 69   Temp 98.2 °F (36.8 °C) (Skin)   Resp 16   Ht 6' 3\" (1.905 m)   Wt (!) 143 kg (315 lb)   SpO2 99%   BMI 39.37 kg/m²   No LMP for male patient.       Physical Exam     Physical Exam  Vitals and nursing note reviewed.   Constitutional:       General: He is not in acute distress.     Appearance: Normal appearance. He is obese. He is not ill-appearing.   HENT:      Head: Normocephalic.   Cardiovascular:      Rate and Rhythm: Normal rate.      Pulses: Normal pulses.   Pulmonary:      Effort: Pulmonary effort is normal.   Skin:     General: Skin is warm and dry.      Findings: Rash present. Rash is macular and pustular.   Neurological:      Mental Status: He is alert.                   "

## 2024-06-15 ENCOUNTER — AMB VIDEO VISIT (OUTPATIENT)
Dept: OTHER | Facility: HOSPITAL | Age: 19
End: 2024-06-15
Payer: COMMERCIAL

## 2024-06-15 ENCOUNTER — AMB VIDEO VISIT (OUTPATIENT)
Dept: OTHER | Facility: HOSPITAL | Age: 19
End: 2024-06-15

## 2024-06-15 DIAGNOSIS — L23.7 POISON IVY DERMATITIS: Primary | ICD-10-CM

## 2024-06-15 PROCEDURE — 99212 OFFICE O/P EST SF 10 MIN: CPT | Performed by: PHYSICIAN ASSISTANT

## 2024-06-15 RX ORDER — PREDNISONE 10 MG/1
TABLET ORAL
Qty: 21 TABLET | Refills: 0 | Status: SHIPPED | OUTPATIENT
Start: 2024-06-15

## 2024-06-15 NOTE — CARE ANYWHERE EVISITS
Visit Summary for MITUL ROTHMAN - Gender: Male - Date of Birth: 2005  Date: [id number] - Duration: 4 minutes  Patient: MITUL ROTHMAN  Provider: Carolina Sequeira PA-C    Patient Contact Information  Address  [address]  [place]; [place] [address]  [phone]    Visit Topics  Poison  [Added By: Self - 2024-06-15]    Triage Questions   What is your current physical address in the event of a medical emergency? Answer []  Are you allergic to any medications? Answer []  Are you now or could you be pregnant? Answer []  Do you have any immune system compromise or chronic lung   disease? Answer []  Do you have any vulnerable family members in the home (infant, pregnant, cancer, elderly)? Answer []     Conversation Transcripts  [0A][0A] [Notification] You are connected with Carolina Sequeira PA-C, Urgent Care Specialist.[0A][Notification] MITUL ROTHMAN is located in [place].[0A][Notification] MITUL ROTHMAN has shared health history...[0A]    Diagnosis  Allergic contact dermatitis, unspecified cause    Procedures  Value: [id number] Code: CPT-4 UNLISTED E&M SERVICE    Medications Prescribed    No prescriptions ordered    Electronically signed by: Carolina Sequeira PA-C(NPI [id number])

## 2024-06-16 NOTE — PROGRESS NOTES
"Required Documentation:  Encounter provider: Carolina Sequeira PA-C    Identify all parties in room with patient during virtual visit:  No one else    The patient was identified by name and date of birth. Williams Calvo was informed that this is a telemedicine visit and that the visit is being conducted through the [employer] platform. He agrees to proceed..  My office door was closed. No one else was in the room.  He acknowledged consent and understanding of privacy and security of the video platform. The patient has agreed to participate and understands they can discontinue the visit at any time.    Verification of patient location:  Patient is located at Home in the following state in which I hold an active license [place]    Patient is aware this is a billable service.     Reason for visit is No chief complaint on file.       Subjective  18 y.o. male presents for evaluation of \"poison Ivy\" notes on b/l hands and arms. Notes he works as a  and has had poison ivy many times typically requires long prednisone taper to clear it. Denies fever, chills, N/V//d, lip, face or tongue swelling, no leision on face or neck as of yet.            Past Medical History:   Diagnosis Date    Closed right ankle fracture 2017       No past surgical history on file.     No Known Allergies    Review of Systems   Skin:  Positive for rash.   All other systems reviewed and are negative.      Video Exam    There were no vitals filed for this visit.    Physical Exam  Constitutional:       General: He is not in acute distress.     Appearance: Normal appearance. He is not ill-appearing or toxic-appearing.   HENT:      Head: Normocephalic and atraumatic.      Right Ear: External ear normal.      Left Ear: External ear normal.      Nose: Nose normal.   Eyes:      Extraocular Movements: Extraocular movements intact.      Conjunctiva/sclera: Conjunctivae normal.   Pulmonary:      Effort: Pulmonary effort is normal. No respiratory " distress.      Breath sounds: No stridor. No wheezing.   Skin:     Findings: Rash present. Rash is papular and vesicular.             Comments: Rash b/l hands and wrists consistent with poison ivy dermatitis.   Neurological:      General: No focal deficit present.      Mental Status: He is alert and oriented to person, place, and time. Mental status is at baseline.   Psychiatric:         Mood and Affect: Mood normal.         Behavior: Behavior normal.         Thought Content: Thought content normal.         Judgment: Judgment normal.         Visit Time  Total Visit Duration: 9 minutes    Assessment/Plan:    Diagnoses and all orders for this visit:    Poison ivy dermatitis  -     predniSONE 10 mg tablet; 6 Tabs day 1, 5 tabs day 2, 4 tabs day 3, 3 tabs day 4, 2 tabs day 5, 1 tab day 6        Patient Instructions   Continue supportive care including but not limited to Bendaryl as needed for itching, daily allergy medication such as zyrtec, allegra, Claritin, Xyzal, take all medication as directed.   Follow up with PCP in 2-3 days if symptoms not improving. Go to ER if worsening symptoms or development of facial/lip/tongue or eye swelling, shortness of breath, wheezing, chest pain, difficulty swallowing.

## 2024-06-16 NOTE — PATIENT INSTRUCTIONS
Continue supportive care including but not limited to Bendaryl as needed for itching, daily allergy medication such as zyrtec, allegra, Claritin, Xyzal, take all medication as directed.   Follow up with PCP in 2-3 days if symptoms not improving. Go to ER if worsening symptoms or development of facial/lip/tongue or eye swelling, shortness of breath, wheezing, chest pain, difficulty swallowing.

## 2024-06-18 NOTE — PROGRESS NOTES
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  We discussed the risk for recurrence and worsening.  His current stricture disease is unlikely to be playing a role in his ability to empty his bladder although he did have some mild trabeculations on cystoscopy and PVR was 60 cc at last appointment.    We discussed options including the role for balloon dilation which I think has more risk than benefits.  Recommend observation with plan for repeat cystoscopy within 1 to 2 years.    Gross hematuria  Does not have malignancy based on CT scan and cystoscopy.  Urine cytology was unable to be obtained because the patient could not void before cystoscopy and then voided into the toilet after.    Dysuria  Unclear why the patient has dysuria but very likely related to his inflammatory stricture appearing disease process given his persistent pyuria.  This suggest perhaps a role for infection but his urine cultures have been contaminated or grown group B strep.  We discussed options for observation versus a empiric course of antibiotics for about 1 month with doxycycline.  I think this is low yield but reasonable.  We also discussed the role for a neuropathic pain medication which I do not think is indicated based on current symptoms.  After conversation the patient wants to observe.  We will see him back in 6 to 9 months to reassess how he is doing.  Would also obtain urinalysis and urine culture at that time.  If he is continuing to have similar issues and a repeat urinalysis at that time shows continued pyuria (Even with negative urine cx) then I do think we should more strongly consider a course of empiric antibiotics.          Subjective:      Patient ID: Williams Calvo is a 18 y.o. male.    HPI  Williams Calvo is a 18 y.o. male new patient here for evaluation of dysuria.       Denies seeing urology in the past. Patient states he has had dysuria for the past 5 years which occurs intermittently more likely when he is sitting than when he is standing.  Does not seem to be related to fluid intake although the patient reports he is always drinking a large quantity of water because his work keeps him outside.  He denies any significant difficulties voiding although he sometimes has a split stream.  PVR 63 cc.    He also reports he has had recurrent gross hematuria at least a few times a month. US recently negative.  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  No concern for malignancy.    Cystoscopy today showed inflammatory multifocal short stricture disease within the pendulous and bulbar urethra.  No lesions within the bladder. he did have mild trabeculations.    STD testing negative in November. Denies exposures since.   Urine culture in October positive for Group B strep.  Urine culture in February 2024 grew mixed contaminants 60 K.  Urinalyses in October 2023 and every 2024 both showed pyuria.    Denies recurrent UTI, hospitalizations, family history of  malignancies.      History reviewed. No pertinent surgical history.     Past Medical History:   Diagnosis Date    Closed right ankle fracture 2017             Review of Systems   Constitutional:  Negative for chills and fever.   HENT:  Negative for ear pain and sore throat.    Eyes:  Negative for pain and visual disturbance.   Respiratory:  Negative for cough and shortness of breath.    Cardiovascular:  Negative for chest pain and palpitations.   Gastrointestinal:  Negative for abdominal pain and vomiting.   Genitourinary:  Positive for dysuria and hematuria.   Musculoskeletal:  Negative for arthralgias and back pain.   Skin:  Negative for color change and rash.   Neurological:  Negative for seizures and syncope.   All other systems reviewed and are  "negative.        Objective:      /68 (BP Location: Left arm, Patient Position: Sitting, Cuff Size: Adult)   Pulse 76   Ht 6' 3\" (1.905 m)   Wt (!) 145 kg (320 lb 6.4 oz)   SpO2 100%   BMI 40.05 kg/m²     No results found for: \"PSA\"       Physical Exam  Vitals reviewed.   Constitutional:       Appearance: Normal appearance. He is normal weight.   HENT:      Head: Normocephalic and atraumatic.   Eyes:      Pupils: Pupils are equal, round, and reactive to light.   Abdominal:      General: Abdomen is flat.   Neurological:      General: No focal deficit present.      Mental Status: He is alert and oriented to person, place, and time.   Psychiatric:         Mood and Affect: Mood normal.         Thought Content: Thought content normal.              Cystoscopy     Date/Time  6/25/2024 8:00 AM     Performed by  Obi Muro MD   Authorized by  Obi Muro MD     Universal Protocol:  Consent: Written consent obtained.  Risks and benefits: risks, benefits and alternatives were discussed  Consent given by: patient  Time out: Immediately prior to procedure a \"time out\" was called to verify the correct patient, procedure, equipment, support staff and site/side marked as required.  Patient understanding: patient states understanding of the procedure being performed  Patient consent: the patient's understanding of the procedure matches consent given  Procedure consent: procedure consent matches procedure scheduled  Patient identity confirmed: verbally with patient      Procedure Details:  Procedure type: cystoscopy    Patient tolerance: Patient tolerated the procedure well with no immediate complications    Additional Procedure Details: A time-out was performed identifying the correct patient site and procedure.  A MA chaperone was in the room.  A flexible cystoscope was introduced into the urethra.  The pendulous urethra showed multiple inflammatory appearing short strictures each measuring about 2 to 5 mm in " length and about 12-14 Belarusian in diameter.  These were able to be gently pushed through with the scope.  There also was similar inflammatory stricture disease in the bulbar urethra.  Photos of these areas were taken before and after scope passage.  The prostatic urethra showed mild bilateral lobar hypertrophy without a median lobe.  The bladder did not have any lesions concerning for malignancy.  There were mild trabeculations and no diverticula.  The trigone's appeared more full than usual and it was not easy to visualize ureteral orifices so unclear if he has single or duplicated system on the right or left    I personally reviewed the patient's CT scan images.  I do not see any concerning findings in the kidneys or ureters to explain their hematuria.  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 As per review of electronic medical record, patient with dysuria for 5 years and recurrent gross hematuria several times a month.     COMPARISON: Renal/bladder ultrasound from 1/31/2024.     TECHNIQUE: A CT of the abdomen and pelvis was performed without and with intravenous contrast administration.  Postcontrast images were obtained using split bolus technique with simultaneous nephrographic and delayed phases.   Multiplanar 2D reformatted   images were created from the source data.     This examination, like all CT scans performed in the [facility], was performed utilizing techniques to minimize radiation dose exposure, including the use of iterative reconstruction and automated exposure control. Radiation dose length   product (DLP) for this visit:  2710 mGy-cm     IV Contrast:  100 mL of iohexol (OMNIPAQUE)  Enteric Contrast:  Enteric contrast was not administered.      FINDINGS:     RIGHT KIDNEY AND URETER: Homogeneous right renal enhancement. No urinary tract calculi. No perinephric collection.     No solid renal mass. There are 3 subcentimeter hypodense lesions in the right kidney, too small to accurately characterize with CT technique, however statistically most likely are cysts.     There is a duplicated collecting system on the right. 2 separate ureters are seen to the level of the pelvic brim. Beyond this level it is not clear whether the 2 ureters form a single ureter, however only a single ureter is partially opacified with   excreted IV contrast. No hydronephrosis. No filling defects in the collecting system or the opacified proximal ureters.     LEFT KIDNEY AND URETER: Homogeneous left renal enhancement. No urinary tract calculi. No perinephric collection.     No solid renal mass. A 3.1 cm simple cyst is seen in the left kidney.     There is also a duplicated collecting system on the left with 2 ureters which join into a common ureter just proximal to the ureterovesicular junction. No hydronephrosis. No filling defects in the left-sided collecting systems or ureters.     URINARY BLADDER: No discrete bladder wall mass. No urinary bladder calculi.        LOWER CHEST: Clear visualized lung bases. No pleural effusions.     LIVER/BILIARY TREE:  Normal liver morphology.  No focal hepatic lesions.     No biliary ductal dilation.     GALLBLADDER:  No calcified gallstones. No pericholecystic inflammatory change.     SPLEEN: Unremarkable. A small soft tissue density nodule is seen adjacent to the spleen, most likely representing a splenule.     PANCREAS:  Unremarkable. Normal caliber main pancreatic duct.     ADRENAL GLANDS:  Unremarkable.     STOMACH AND BOWEL:  Evaluation of the gastrointestinal tract is somewhat limited by underdistention and lack of oral contrast. No bowel obstruction or convincing inflammation.     APPENDIX:  Normal appendix.     ABDOMINOPELVIC CAVITY:  No  ascites or pneumoperitoneum.     LYMPH NODES: No abdominal or pelvic lymphadenopathy.     VESSELS: Normal caliber aorta. Patent major branch vessels.     REPRODUCTIVE ORGANS: The prostate gland is not enlarged.     ABDOMINAL WALL/INGUINAL REGIONS: No ventral abdominal wall hernia.     MUSCULOSKELETAL:  No focal aggressive osseous lesions.     IMPRESSION:     1) No urinary tract calculi. No hydronephrosis.     2) Duplicated collecting systems and ureters bilaterally. On the left 2 separate ureters combine to form a single ureter just proximal to the left UVJ. The exact configuration of the distal right-sided ureter/ureters is unclear as 2 separate ureters are   seen proximally, however poor visualization of the right ureters below the level of the pelvic brim.     3) No upper tract urothelial lesions on the left. No upper tract urothelial lesions in the right collecting systems and proximal halves of the 2 right-sided ureters. Distal half of the right ureter/ureters not opacified, limiting evaluation.     4) No suspicious renal masses.     5) Unremarkable CT appearance of the urinary bladder.     6) Additional findings as above.       Orders  Orders Placed This Encounter   Procedures    Cystoscopy     This order was created via procedure documentation

## 2024-06-25 ENCOUNTER — PROCEDURE VISIT (OUTPATIENT)
Dept: UROLOGY | Facility: CLINIC | Age: 19
End: 2024-06-25
Payer: COMMERCIAL

## 2024-06-25 VITALS
HEIGHT: 75 IN | BODY MASS INDEX: 39.17 KG/M2 | DIASTOLIC BLOOD PRESSURE: 68 MMHG | SYSTOLIC BLOOD PRESSURE: 118 MMHG | HEART RATE: 76 BPM | WEIGHT: 315 LBS | OXYGEN SATURATION: 100 %

## 2024-06-25 DIAGNOSIS — N35.914 STRICTURE OF ANTERIOR URETHRA IN MALE, UNSPECIFIED STRICTURE TYPE: ICD-10-CM

## 2024-06-25 DIAGNOSIS — R31.0 GROSS HEMATURIA: ICD-10-CM

## 2024-06-25 DIAGNOSIS — R30.0 DYSURIA: Primary | ICD-10-CM

## 2024-06-25 PROCEDURE — 99214 OFFICE O/P EST MOD 30 MIN: CPT | Performed by: UROLOGY

## 2024-06-25 PROCEDURE — 52000 CYSTOURETHROSCOPY: CPT | Performed by: UROLOGY

## 2024-06-25 NOTE — ASSESSMENT & PLAN NOTE
Unclear why the patient has dysuria but very likely related to his inflammatory stricture appearing disease process given his persistent pyuria.  This suggest perhaps a role for infection but his urine cultures have been contaminated or grown group B strep.  We discussed options for observation versus a empiric course of antibiotics for about 1 month with doxycycline.  I think this is low yield but reasonable.  We also discussed the role for a neuropathic pain medication which I do not think is indicated based on current symptoms.  After conversation the patient wants to observe.  We will see him back in 6 to 9 months to reassess how he is doing.  Would also obtain urinalysis and urine culture at that time.  If he is continuing to have similar issues and a repeat urinalysis at that time shows continued pyuria (Even with negative urine cx) then I do think we should more strongly consider a course of empiric antibiotics.

## 2024-06-25 NOTE — ASSESSMENT & PLAN NOTE
Does not have malignancy based on CT scan and cystoscopy.  Urine cytology was unable to be obtained because the patient could not void before cystoscopy and then voided into the toilet after.

## 2024-06-25 NOTE — ASSESSMENT & PLAN NOTE
The patient had multifocal strictures of primarily anterior urethra that were approximately 14 Georgian in size and opened via cystoscopy.  We discussed the risk for recurrence and worsening.  His current stricture disease is unlikely to be playing a role in his ability to empty his bladder although he did have some mild trabeculations on cystoscopy and PVR was 60 cc at last appointment.    We discussed options including the role for balloon dilation which I think has more risk than benefits.  Recommend observation with plan for repeat cystoscopy within 1 to 2 years.

## 2024-07-06 ENCOUNTER — AMB VIDEO VISIT (OUTPATIENT)
Dept: OTHER | Facility: HOSPITAL | Age: 19
End: 2024-07-06
Payer: COMMERCIAL

## 2024-07-06 VITALS — RESPIRATION RATE: 16 BRPM

## 2024-07-06 DIAGNOSIS — L23.7 POISON IVY DERMATITIS: Primary | ICD-10-CM

## 2024-07-06 PROCEDURE — 99212 OFFICE O/P EST SF 10 MIN: CPT | Performed by: NURSE PRACTITIONER

## 2024-07-06 RX ORDER — TRIAMCINOLONE ACETONIDE 1 MG/G
CREAM TOPICAL 2 TIMES DAILY
Qty: 5 G | Refills: 1 | Status: SHIPPED | OUTPATIENT
Start: 2024-07-06

## 2024-07-06 RX ORDER — PREDNISONE 10 MG/1
TABLET ORAL
Qty: 30 TABLET | Refills: 0 | Status: SHIPPED | OUTPATIENT
Start: 2024-07-06 | End: 2024-07-16

## 2024-07-06 NOTE — PROGRESS NOTES
Required Documentation:  Encounter provider: JACKIE Sheldon    Identify all parties in room with patient during virtual visit:  No one else    The patient was identified by name and date of birth. Williams Calvo was informed that this is a telemedicine visit and that the visit is being conducted through the [employer] platform. He agrees to proceed..  My office door was closed. No one else was in the room.  He acknowledged consent and understanding of privacy and security of the video platform. The patient has agreed to participate and understands they can discontinue the visit at any time.    Verification of patient location:  Patient is located at Home in the following state in which I hold an active license [place]    Patient is aware this is a billable service.     Reason for visit is No chief complaint on file.       Subjective  This is a 18 year old male here today for video visit.  He states he has poison ivy.  He states this started 2 days ago.  He states it is in between his fingers.  He states he has several spots on on the top of the hand.  He denies any sob or chest pain.  He states he gets poison ivy bad and often.  He does work outside.  He states he does try to wear gloves.            Past Medical History:   Diagnosis Date    Closed right ankle fracture 2017       No past surgical history on file.     No Known Allergies    Review of Systems   Constitutional:  Negative for activity change, chills, fatigue and fever.   Respiratory: Negative.     Cardiovascular: Negative.    Skin:  Positive for rash.   Neurological: Negative.    Psychiatric/Behavioral: Negative.         Video Exam    Vitals:    07/06/24 0720   Resp: 16       Physical Exam  Constitutional:       General: He is not in acute distress.     Appearance: Normal appearance. He is not ill-appearing or toxic-appearing.   HENT:      Head: Normocephalic and atraumatic.   Skin:     Comments: Erythemic rash between fingers.  Several  scattered areas on tops of hands.    Neurological:      Mental Status: He is alert and oriented to person, place, and time.   Psychiatric:         Mood and Affect: Mood normal.         Behavior: Behavior normal.         Thought Content: Thought content normal.         Judgment: Judgment normal.         Visit Time  Total Visit Duration: 8 minutes    Assessment/Plan:    Diagnoses and all orders for this visit:    Poison ivy dermatitis  -     predniSONE 10 mg tablet; Take 5 tablets (50 mg total) by mouth daily for 2 days, THEN 4 tablets (40 mg total) daily for 2 days, THEN 3 tablets (30 mg total) daily for 2 days, THEN 2 tablets (20 mg total) daily for 2 days, THEN 1 tablet (10 mg total) daily for 2 days.  -     triamcinolone (KENALOG) 0.1 % cream; Apply topically 2 (two) times a day        Patient Instructions   Start steroid.  Will also give you cream. . Avoid scratching the areas.  Topical benadryl or calamine lotion.  As we discussed you can also try the cream at the first sign of poison next time to try to avoid being on prednisone multiple times this summer.  Follow up with PCP if no improvement.  Go to ER with worsening symptoms.     Patient Education     Poison Ivy   What is this product used for?   The use of poison ivy for any health condition is not supported.  What are the precautions when taking this product?   Always check with your doctor before you use a natural product. Some products may not mix well with drugs or other natural products.  Do not swallow this product. Serious side effects, including death, may happen if you are very allergic to poison ivy, cashews, or mangoes.  Do not use this product if you are pregnant or breastfeeding.  What should I watch for?   Rash or blisters  Mouth or throat irritation  When do I need to call the doctor?   Signs of a very bad reaction. These include wheezing; chest tightness; fever; itching; bad cough; blue skin color; seizures; or swelling of face, lips,  tongue, or throat. Go to the ER right away.  Very bad throwing up  Very bad loose stools  Last Reviewed Date   2022-04-13  Consumer Information Use and Disclaimer   This generalized information is a limited summary of diagnosis, treatment, and/or medication information. It is not meant to be comprehensive and should be used as a tool to help the user understand and/or assess potential diagnostic and treatment options. It does NOT include all information about conditions, treatments, medications, side effects, or risks that may apply to a specific patient. It is not intended to be medical advice or a substitute for the medical advice, diagnosis, or treatment of a health care provider based on the health care provider's examination and assessment of a patient’s specific and unique circumstances. Patients must speak with a health care provider for complete information about their health, medical questions, and treatment options, including any risks or benefits regarding use of medications. This information does not endorse any treatments or medications as safe, effective, or approved for treating a specific patient. UpToDate, Inc. and its affiliates disclaim any warranty or liability relating to this information or the use thereof. The use of this information is governed by the Terms of Use, available at https://www.woltersMobuleuwer.com/en/know/clinical-effectiveness-terms   Copyright   Copyright © 2024 UpToDate, Inc. and its affiliates and/or licensors. All rights reserved.

## 2024-07-06 NOTE — CARE ANYWHERE EVISITS
Visit Summary for MITUL ROTHMAN - Gender: Male - Date of Birth: 2005  Date: [id number] - Duration: 5 minutes  Patient: MITUL ROTHMAN  Provider: Taisha MEJIA    Patient Contact Information  Address  [address]  [place]; [place] [address]  [phone]    Visit Topics  Poison  [Added By: Self - 2024-07-06]    Triage Questions   What is your current physical address in the event of a medical emergency? Answer []  Are you allergic to any medications? Answer []  Are you now or could you be pregnant? Answer []  Do you have any immune system compromise or chronic lung   disease? Answer []  Do you have any vulnerable family members in the home (infant, pregnant, cancer, elderly)? Answer []     Conversation Transcripts  [0A][0A] [Notification] You are connected with Taisha MEJIA, Urgent Care Specialist.[0A][Notification] MITUL ROTHMAN is located in [place].[0A][Notification] MITUL ROTHMAN has shared health history...[0A]    Diagnosis  Dermatitis, unspecified    Procedures  Value: [id number] Code: CPT-4 UNLISTED E&M SERVICE    Medications Prescribed    No prescriptions ordered    Electronically signed by: Taisha Bullard(NPI [id number])

## 2024-07-06 NOTE — PATIENT INSTRUCTIONS
Start steroid.  Will also give you cream. . Avoid scratching the areas.  Topical benadryl or calamine lotion.  As we discussed you can also try the cream at the first sign of poison next time to try to avoid being on prednisone multiple times this summer.  Follow up with PCP if no improvement.  Go to ER with worsening symptoms.     Patient Education     Poison Ivy   What is this product used for?   The use of poison ivy for any health condition is not supported.  What are the precautions when taking this product?   Always check with your doctor before you use a natural product. Some products may not mix well with drugs or other natural products.  Do not swallow this product. Serious side effects, including death, may happen if you are very allergic to poison ivy, cashews, or mangoes.  Do not use this product if you are pregnant or breastfeeding.  What should I watch for?   Rash or blisters  Mouth or throat irritation  When do I need to call the doctor?   Signs of a very bad reaction. These include wheezing; chest tightness; fever; itching; bad cough; blue skin color; seizures; or swelling of face, lips, tongue, or throat. Go to the ER right away.  Very bad throwing up  Very bad loose stools  Last Reviewed Date   2022-04-13  Consumer Information Use and Disclaimer   This generalized information is a limited summary of diagnosis, treatment, and/or medication information. It is not meant to be comprehensive and should be used as a tool to help the user understand and/or assess potential diagnostic and treatment options. It does NOT include all information about conditions, treatments, medications, side effects, or risks that may apply to a specific patient. It is not intended to be medical advice or a substitute for the medical advice, diagnosis, or treatment of a health care provider based on the health care provider's examination and assessment of a patient’s specific and unique circumstances. Patients must speak  with a health care provider for complete information about their health, medical questions, and treatment options, including any risks or benefits regarding use of medications. This information does not endorse any treatments or medications as safe, effective, or approved for treating a specific patient. UpToDate, Inc. and its affiliates disclaim any warranty or liability relating to this information or the use thereof. The use of this information is governed by the Terms of Use, available at https://www.woltersTresatauwer.com/en/know/clinical-effectiveness-terms   Copyright   Copyright © 2024 UpToDate, Inc. and its affiliates and/or licensors. All rights reserved.

## 2024-09-05 ENCOUNTER — AMB VIDEO VISIT (OUTPATIENT)
Dept: OTHER | Facility: HOSPITAL | Age: 19
End: 2024-09-05
Payer: COMMERCIAL

## 2024-09-05 DIAGNOSIS — L23.7 CONTACT DERMATITIS DUE TO POISON IVY: Primary | ICD-10-CM

## 2024-09-05 PROCEDURE — 99212 OFFICE O/P EST SF 10 MIN: CPT | Performed by: NURSE PRACTITIONER

## 2024-09-05 RX ORDER — METHYLPREDNISOLONE 4 MG/1
TABLET ORAL
Qty: 21 TABLET | Refills: 0 | Status: SHIPPED | OUTPATIENT
Start: 2024-09-05

## 2024-09-05 NOTE — PROGRESS NOTES
Required Documentation:  Encounter provider: JACKIE Sheldon    Identify all parties in room with patient during virtual visit:  No one else    The patient was identified by name and date of birth. Williams Calvo was informed that this is a telemedicine visit and that the visit is being conducted through the [employer] platform. He agrees to proceed..  My office door was closed. No one else was in the room.  He acknowledged consent and understanding of privacy and security of the video platform. The patient has agreed to participate and understands they can discontinue the visit at any time.    Verification of patient location:  Patient is located at Home in the following state in which I hold an active license [place]    Patient is aware this is a billable service.     Reason for visit is No chief complaint on file.       Subjective  This is a 19 year old male here today for video visit.  He states he has poison ivy again.  He has been using cream as needed which does help but thinks it is going to get worse.  He states the areas is itchy.  He states he has it between his fingers, scattered areas on legs.  He has some on his chest and genital areas.             Past Medical History:   Diagnosis Date    Closed right ankle fracture 2017       No past surgical history on file.     No Known Allergies    Review of Systems   Cardiovascular: Negative.    Skin:  Positive for rash.   Neurological: Negative.    Psychiatric/Behavioral: Negative.         Video Exam    There were no vitals filed for this visit.    Physical Exam  Constitutional:       General: He is not in acute distress.     Appearance: Normal appearance. He is not ill-appearing or toxic-appearing.   Skin:     Comments: Scattered erythemic lesion between fingers and on legs.  Did not show me his torso and deferred genital region.    Neurological:      Mental Status: He is alert and oriented to person, place, and time.   Psychiatric:         Mood and  Affect: Mood normal.         Behavior: Behavior normal.         Judgment: Judgment normal.         Visit Time  Total Visit Duration: 5 minutes    Assessment/Plan:    Diagnoses and all orders for this visit:    Contact dermatitis due to poison ivy  -     methylprednisolone (MEDROL) 4 mg tablet; Medrol dose pack Take as directed.        Patient Instructions   Start steroid taper.  Avoid scratching areas.  Cream as needed.  Follow up with PCP if no improvement.  Go to ER with any worsening symptoms.

## 2024-09-05 NOTE — PATIENT INSTRUCTIONS
Start steroid taper.  Avoid scratching areas.  Cream as needed.  Follow up with PCP if no improvement.  Go to ER with any worsening symptoms.

## 2024-09-06 NOTE — CARE ANYWHERE EVISITS
Visit Summary for MITUL ROTHMAN - Gender: Male - Date of Birth: 2005  Date: [id number] - Duration: 3 minutes  Patient: MITUL ROTHMAN  Provider: Taisha MEJIA    Patient Contact Information  Address  [address]  [place]; [place] [address]  [phone]    Visit Topics    Triage Questions   What is your current physical address in the event of a medical emergency? Answer []  Are you allergic to any medications? Answer []  Are you now or could you be pregnant? Answer []  Do you have any immune system compromise or chronic lung   disease? Answer []  Do you have any vulnerable family members in the home (infant, pregnant, cancer, elderly)? Answer []     Conversation Transcripts  [0A][0A] [Notification] You are connected with Taisha MEJIA, Urgent Care Specialist.[0A][Notification] MITUL ROTHMAN is located in [place].[0A][Notification] MITUL ROTHMAN has shared health history...[0A]    Diagnosis  Rash and other nonspecific skin eruption    Procedures  Value: [id number] Code: CPT-4 UNLISTED E&M SERVICE    Medications Prescribed    No prescriptions ordered    Electronically signed by: Taisha Bullard(NPI [id number])

## 2024-11-06 ENCOUNTER — HOSPITAL ENCOUNTER (EMERGENCY)
Facility: HOSPITAL | Age: 19
Discharge: HOME | End: 2024-11-06
Attending: EMERGENCY MEDICINE
Payer: COMMERCIAL

## 2024-11-06 ENCOUNTER — APPOINTMENT (EMERGENCY)
Dept: CT IMAGING | Facility: HOSPITAL | Age: 19
End: 2024-11-06
Payer: COMMERCIAL

## 2024-11-06 VITALS
DIASTOLIC BLOOD PRESSURE: 72 MMHG | OXYGEN SATURATION: 100 % | HEART RATE: 88 BPM | SYSTOLIC BLOOD PRESSURE: 129 MMHG | TEMPERATURE: 99.1 F | RESPIRATION RATE: 20 BRPM

## 2024-11-06 DIAGNOSIS — R10.9 FLANK PAIN: Primary | ICD-10-CM

## 2024-11-06 DIAGNOSIS — R31.9 HEMATURIA: ICD-10-CM

## 2024-11-06 DIAGNOSIS — N39.0 URINARY TRACT INFECTION: ICD-10-CM

## 2024-11-06 LAB
ALBUMIN SERPL BCG-MCNC: 4.5 G/DL (ref 3.5–5)
ALP SERPL-CCNC: 79 U/L (ref 34–104)
ALT SERPL W P-5'-P-CCNC: 33 U/L (ref 7–52)
ANION GAP SERPL CALCULATED.3IONS-SCNC: 8 MMOL/L (ref 4–13)
AST SERPL W P-5'-P-CCNC: 23 U/L (ref 13–39)
BACTERIA UR QL AUTO: ABNORMAL /HPF
BASOPHILS # BLD AUTO: 0.04 THOUSANDS/ÂΜL (ref 0–0.1)
BASOPHILS NFR BLD AUTO: 1 % (ref 0–1)
BILIRUB SERPL-MCNC: 0.41 MG/DL (ref 0.2–1)
BILIRUB UR QL STRIP: NEGATIVE
BUN SERPL-MCNC: 15 MG/DL (ref 5–25)
CALCIUM SERPL-MCNC: 9.4 MG/DL (ref 8.4–10.2)
CHLORIDE SERPL-SCNC: 105 MMOL/L (ref 96–108)
CLARITY UR: CLEAR
CO2 SERPL-SCNC: 26 MMOL/L (ref 21–32)
COLOR UR: ABNORMAL
CREAT SERPL-MCNC: 0.82 MG/DL (ref 0.6–1.3)
EOSINOPHIL # BLD AUTO: 0.12 THOUSAND/ÂΜL (ref 0–0.61)
EOSINOPHIL NFR BLD AUTO: 1 % (ref 0–6)
ERYTHROCYTE [DISTWIDTH] IN BLOOD BY AUTOMATED COUNT: 13.6 % (ref 11.6–15.1)
GFR SERPL CREATININE-BSD FRML MDRD: 126 ML/MIN/1.73SQ M
GLUCOSE SERPL-MCNC: 87 MG/DL (ref 65–140)
GLUCOSE UR STRIP-MCNC: NEGATIVE MG/DL
HCT VFR BLD AUTO: 41.6 % (ref 36.5–49.3)
HGB BLD-MCNC: 13.3 G/DL (ref 12–17)
HGB UR QL STRIP.AUTO: ABNORMAL
IMM GRANULOCYTES # BLD AUTO: 0.02 THOUSAND/UL (ref 0–0.2)
IMM GRANULOCYTES NFR BLD AUTO: 0 % (ref 0–2)
KETONES UR STRIP-MCNC: ABNORMAL MG/DL
LEUKOCYTE ESTERASE UR QL STRIP: ABNORMAL
LIPASE SERPL-CCNC: 6 U/L (ref 11–82)
LYMPHOCYTES # BLD AUTO: 1.74 THOUSANDS/ÂΜL (ref 0.6–4.47)
LYMPHOCYTES NFR BLD AUTO: 20 % (ref 14–44)
MCH RBC QN AUTO: 27.1 PG (ref 26.8–34.3)
MCHC RBC AUTO-ENTMCNC: 32 G/DL (ref 31.4–37.4)
MCV RBC AUTO: 85 FL (ref 82–98)
MONOCYTES # BLD AUTO: 0.6 THOUSAND/ÂΜL (ref 0.17–1.22)
MONOCYTES NFR BLD AUTO: 7 % (ref 4–12)
MUCOUS THREADS UR QL AUTO: ABNORMAL
NEUTROPHILS # BLD AUTO: 6.12 THOUSANDS/ÂΜL (ref 1.85–7.62)
NEUTS SEG NFR BLD AUTO: 71 % (ref 43–75)
NITRITE UR QL STRIP: NEGATIVE
NON-SQ EPI CELLS URNS QL MICRO: ABNORMAL /HPF
NRBC BLD AUTO-RTO: 0 /100 WBCS
PH UR STRIP.AUTO: 6 [PH]
PLATELET # BLD AUTO: 320 THOUSANDS/UL (ref 149–390)
PMV BLD AUTO: 9.4 FL (ref 8.9–12.7)
POTASSIUM SERPL-SCNC: 4.2 MMOL/L (ref 3.5–5.3)
PROT SERPL-MCNC: 7.8 G/DL (ref 6.4–8.4)
PROT UR STRIP-MCNC: ABNORMAL MG/DL
RBC # BLD AUTO: 4.9 MILLION/UL (ref 3.88–5.62)
RBC #/AREA URNS AUTO: ABNORMAL /HPF
SODIUM SERPL-SCNC: 139 MMOL/L (ref 135–147)
SP GR UR STRIP.AUTO: >=1.05 (ref 1–1.03)
UROBILINOGEN UR STRIP-ACNC: <2 MG/DL
WBC # BLD AUTO: 8.64 THOUSAND/UL (ref 4.31–10.16)
WBC #/AREA URNS AUTO: ABNORMAL /HPF

## 2024-11-06 PROCEDURE — 87086 URINE CULTURE/COLONY COUNT: CPT

## 2024-11-06 PROCEDURE — 99284 EMERGENCY DEPT VISIT MOD MDM: CPT | Performed by: EMERGENCY MEDICINE

## 2024-11-06 PROCEDURE — 87563 M. GENITALIUM AMP PROBE: CPT

## 2024-11-06 PROCEDURE — 36415 COLL VENOUS BLD VENIPUNCTURE: CPT

## 2024-11-06 PROCEDURE — 96361 HYDRATE IV INFUSION ADD-ON: CPT

## 2024-11-06 PROCEDURE — 99284 EMERGENCY DEPT VISIT MOD MDM: CPT

## 2024-11-06 PROCEDURE — 81001 URINALYSIS AUTO W/SCOPE: CPT

## 2024-11-06 PROCEDURE — 74177 CT ABD & PELVIS W/CONTRAST: CPT

## 2024-11-06 PROCEDURE — 83690 ASSAY OF LIPASE: CPT

## 2024-11-06 PROCEDURE — 87491 CHLMYD TRACH DNA AMP PROBE: CPT

## 2024-11-06 PROCEDURE — 85025 COMPLETE CBC W/AUTO DIFF WBC: CPT

## 2024-11-06 PROCEDURE — 87591 N.GONORRHOEAE DNA AMP PROB: CPT

## 2024-11-06 PROCEDURE — 80053 COMPREHEN METABOLIC PANEL: CPT

## 2024-11-06 PROCEDURE — 96374 THER/PROPH/DIAG INJ IV PUSH: CPT

## 2024-11-06 PROCEDURE — 87661 TRICHOMONAS VAGINALIS AMPLIF: CPT

## 2024-11-06 RX ORDER — KETOROLAC TROMETHAMINE 30 MG/ML
15 INJECTION, SOLUTION INTRAMUSCULAR; INTRAVENOUS ONCE
Status: COMPLETED | OUTPATIENT
Start: 2024-11-06 | End: 2024-11-06

## 2024-11-06 RX ORDER — CEPHALEXIN 500 MG/1
500 CAPSULE ORAL EVERY 6 HOURS SCHEDULED
Qty: 28 CAPSULE | Refills: 0 | Status: SHIPPED | OUTPATIENT
Start: 2024-11-06 | End: 2024-11-16

## 2024-11-06 RX ADMIN — KETOROLAC TROMETHAMINE 15 MG: 30 INJECTION, SOLUTION INTRAMUSCULAR; INTRAVENOUS at 18:49

## 2024-11-06 RX ADMIN — CEPHALEXIN 500 MG: 250 CAPSULE ORAL at 22:36

## 2024-11-06 RX ADMIN — SODIUM CHLORIDE 1000 ML: 0.9 INJECTION, SOLUTION INTRAVENOUS at 18:50

## 2024-11-06 RX ADMIN — IOHEXOL 100 ML: 350 INJECTION, SOLUTION INTRAVENOUS at 19:47

## 2024-11-06 NOTE — ED PROVIDER NOTES
Time reflects when diagnosis was documented in both MDM as applicable and the Disposition within this note       Time User Action Codes Description Comment    11/6/2024 10:09 PM Teofilo Neff [R10.9] Flank pain     11/6/2024 10:09 PM Teofilo Neff Add [R31.9] Hematuria     11/6/2024 10:29 PM Teofilo Neff [N39.0] Urinary tract infection           ED Disposition       ED Disposition   Discharge    Condition   Stable    Date/Time   Wed Nov 6, 2024 10:30 PM    Comment   Williams Amydelia discharge to home/self care.                   Assessment & Plan       Medical Decision Making  19-year-old male presenting to the ED with painful hematuria and bilateral flank pain.    Concern for bladder cancer, UTI, STD, renal stones, adrenal process.    Genital exam was normal with no blood at the tip of the meatus of the penis.  It was noted though that the patient's right testicle was significantly smaller than the left testicle however cremaster reflex was intact and no pain on examination.    Will get CT abdomen pelvis with contrast to the patient as well as CBC, CMP, UA, STD testing.    See ED course for interpretation of results.    Will treat patient's UTI with Keflex and give him urology follow-up.    Patient comfortable plan and comfortable with discharge at this time.  Patient stable for discharge.  Patient discharged.    Amount and/or Complexity of Data Reviewed  Labs: ordered. Decision-making details documented in ED Course.  Radiology: ordered.    Risk  Prescription drug management.        ED Course as of 11/06/24 2236 Wed Nov 06, 2024   1828 1.5 weeks at work.    Tyleniol and advil      Happened a year ago too and saw urology, found kidney cyts, did scope    Hematuria,a little towards the end, with pain on urination  Scope due to pain.   1829 No other medical history, no meds.    Allergies no  No surgeries   1904 CBC and differential  Unremarkable and reassuring   1918 Comprehensive  metabolic panel  Unremarkable and reassuring   1918 LIPASE(!): 6  Reassuring   2125 IMPRESSION:  No CT explanation for patient's symptoms.     Incidental findings appear stable from prior exam.     Workstation performed: [id number]     2213 Blood, UA(!): Small   2213 Leukocytes, UA(!): Small   2228 WBC, UA(!): 10-20       Medications   ketorolac (TORADOL) injection 15 mg (15 mg Intravenous Given 11/6/24 1849)   sodium chloride 0.9 % bolus 1,000 mL (0 mL Intravenous Stopped 11/6/24 1950)   iohexol (OMNIPAQUE) 350 MG/ML injection (MULTI-DOSE) 100 mL (100 mL Intravenous Given 11/6/24 1947)   cephalexin (KEFLEX) capsule 500 mg (500 mg Oral Given 11/6/24 2236)       ED Risk Strat Scores                                               History of Present Illness       Chief Complaint   Patient presents with    Flank Pain     Pt reports having pain in bilateral kidneys for past week and a half. Hx of cysts in kidneys and scar tissue in urethra. C/o pain on urination and blood in urine. Pt took tylenol this morning aprox 0705       Past Medical History:   Diagnosis Date    Closed right ankle fracture 2017      History reviewed. No pertinent surgical history.   Family History   Problem Relation Age of Onset    Diabetes Father     No Known Problems Mother     No Known Problems Sister     No Known Problems Sister       Social History     Tobacco Use    Smoking status: Never     Passive exposure: Never    Smokeless tobacco: Never   Vaping Use    Vaping status: Never Used   Substance Use Topics    Alcohol use: Yes     Comment: Occ ONLY    Drug use: Never      E-Cigarette/Vaping    E-Cigarette Use Never User       E-Cigarette/Vaping Substances    Nicotine No     THC No     CBD No     Flavoring No     Other No     Unknown No       I have reviewed and agree with the history as documented.     19-year-old male presenting to the ED with complaint of bilateral flank pain radiating from the back to the groin starting about 2 days ago.   Patient has had this in the past a year ago and was seen by urology in which they did a cystoscope and had found renal cysts but no other findings.  Patient states he is also having hematuria that is painful when he is peeing but has seen no clots in his urine.  Patient also declines any decrease in his urine stream.  Patient is sexually active and states he wears protection with no signs of STDs.  Patient denies any recent trauma to the penile region.  Pain currently is 4 out of 10 and will fluctuate to a 10 out of 10.  Patient's been taking Tylenol and Advil for pain management which do give him some relief.          Review of Systems   Constitutional:  Negative for chills and fever.   HENT:  Negative for congestion.    Respiratory:  Negative for chest tightness and shortness of breath.    Cardiovascular:  Negative for chest pain and palpitations.   Gastrointestinal:  Negative for abdominal distention, abdominal pain, blood in stool, constipation, diarrhea, nausea and vomiting.   Genitourinary:  Positive for dysuria, flank pain and hematuria. Negative for decreased urine volume, difficulty urinating, enuresis, frequency, genital sores, penile discharge, penile pain, penile swelling, scrotal swelling, testicular pain and urgency.   Musculoskeletal:  Negative for arthralgias and gait problem.   Neurological:  Negative for dizziness, weakness and headaches.   Psychiatric/Behavioral:  Negative for agitation and confusion.            Objective       ED Triage Vitals [11/06/24 1744]   Temperature Pulse Blood Pressure Respirations SpO2 Patient Position - Orthostatic VS   99.1 °F (37.3 °C) 86 143/92 18 100 % Sitting      Temp Source Heart Rate Source BP Location FiO2 (%) Pain Score    Oral Monitor Right arm -- --      Vitals      Date and Time Temp Pulse SpO2 Resp BP Pain Score FACES Pain Rating User   11/06/24 2130 -- 88 100 % 20 129/72 -- -- KK   11/06/24 1744 99.1 °F (37.3 °C) 86 100 % 18 143/92 -- -- EG             Physical Exam  Constitutional:       Appearance: Normal appearance.   HENT:      Head: Normocephalic and atraumatic.      Right Ear: External ear normal.      Left Ear: External ear normal.      Nose: Nose normal.      Mouth/Throat:      Pharynx: Oropharynx is clear.   Eyes:      Extraocular Movements: Extraocular movements intact.   Cardiovascular:      Rate and Rhythm: Normal rate.      Pulses: Normal pulses.      Heart sounds: Normal heart sounds.   Pulmonary:      Effort: Pulmonary effort is normal.      Breath sounds: Normal breath sounds.   Abdominal:      General: Bowel sounds are normal.      Palpations: Abdomen is soft.   Genitourinary:     Penis: Normal and circumcised.       Testes: Cremasteric reflex is present.      Gerhard stage (genital): 5.      Comments: Right testy appears to have smaller size than left testy  Musculoskeletal:         General: Normal range of motion.      Cervical back: Normal range of motion.   Skin:     General: Skin is warm.      Capillary Refill: Capillary refill takes less than 2 seconds.   Neurological:      General: No focal deficit present.      Mental Status: He is alert and oriented to person, place, and time.   Psychiatric:         Mood and Affect: Mood normal.         Behavior: Behavior normal.         Results Reviewed       Procedure Component Value Units Date/Time    Urine Microscopic [[id number]]  (Abnormal) Collected: 11/06/24 2118    Lab Status: Final result Specimen: Urine, Clean Catch Updated: 11/06/24 2228     RBC, UA 10-20 /hpf      WBC, UA 10-20 /hpf      Epithelial Cells Occasional /hpf      Bacteria, UA None Seen /hpf      MUCUS THREADS Occasional    Urine culture [[id number]] Collected: 11/06/24 2118    Lab Status: In process Specimen: Urine, Clean Catch Updated: 11/06/24 2227    UA w Reflex to Microscopic w Reflex to Culture [[id number]]  (Abnormal) Collected: 11/06/24 2118    Lab Status: Final result Specimen: Urine, Clean Catch Updated: 11/06/24 2211      Color, UA Light Yellow     Clarity, UA Clear     Specific Gravity, UA >=1.050     pH, UA 6.0     Leukocytes, UA Small     Nitrite, UA Negative     Protein, UA 50 (1+) mg/dl      Glucose, UA Negative mg/dl      Ketones, UA 20 (1+) mg/dl      Urobilinogen, UA <2.0 mg/dl      Bilirubin, UA Negative     Occult Blood, UA Small    Trichomonas vaginalis/Mycoplasma genitalium PCR [[id number]] Collected: 11/06/24 2120    Lab Status: In process Specimen: Urine, Voided Updated: 11/06/24 2123    Chlamydia/GC amplified DNA by PCR [[id number]] Collected: 11/06/24 2119    Lab Status: In process Specimen: Urine, Other Updated: 11/06/24 2123    Comprehensive metabolic panel [[id number]] Collected: 11/06/24 1849    Lab Status: Final result Specimen: Blood from Arm, Left Updated: 11/06/24 1918     Sodium 139 mmol/L      Potassium 4.2 mmol/L      Chloride 105 mmol/L      CO2 26 mmol/L      ANION GAP 8 mmol/L      BUN 15 mg/dL      Creatinine 0.82 mg/dL      Glucose 87 mg/dL      Calcium 9.4 mg/dL      AST 23 U/L      ALT 33 U/L      Alkaline Phosphatase 79 U/L      Total Protein 7.8 g/dL      Albumin 4.5 g/dL      Total Bilirubin 0.41 mg/dL      eGFR 126 ml/min/1.73sq m     Narrative:      National Kid[employer] Foundation guidelines for Chronic Kidney Disease (CKD):     Stage 1 with normal or high GFR (GFR > 90 mL/min/1.73 square meters)    Stage 2 Mild CKD (GFR = 60-89 mL/min/1.73 square meters)    Stage 3A Moderate CKD (GFR = 45-59 mL/min/1.73 square meters)    Stage 3B Moderate CKD (GFR = 30-44 mL/min/1.73 square meters)    Stage 4 Severe CKD (GFR = 15-29 mL/min/1.73 square meters)    Stage 5 End Stage CKD (GFR <15 mL/min/1.73 square meters)  Note: GFR calculation is accurate only with a steady state creatinine    Lipase [[id number]]  (Abnormal) Collected: 11/06/24 1849    Lab Status: Final result Specimen: Blood from Arm, Left Updated: 11/06/24 1918     Lipase 6 u/L     CBC and differential [[id number]] Collected: 11/06/24 184     Lab Status: Final result Specimen: Blood from Arm, Left Updated: 11/06/24 1900     WBC 8.64 Thousand/uL      RBC 4.90 Million/uL      Hemoglobin 13.3 g/dL      Hematocrit 41.6 %      MCV 85 fL      MCH 27.1 pg      MCHC 32.0 g/dL      RDW 13.6 %      MPV 9.4 fL      Platelets 320 Thousands/uL      nRBC 0 /100 WBCs      Segmented % 71 %      Immature Grans % 0 %      Lymphocytes % 20 %      Monocytes % 7 %      Eosinophils Relative 1 %      Basophils Relative 1 %      Absolute Neutrophils 6.12 Thousands/µL      Absolute Immature Grans 0.02 Thousand/uL      Absolute Lymphocytes 1.74 Thousands/µL      Absolute Monocytes 0.60 Thousand/µL      Eosinophils Absolute 0.12 Thousand/µL      Basophils Absolute 0.04 Thousands/µL             CT abdomen pelvis with contrast   Final Interpretation by Charan Aguila DO (11/06 2118)   No CT explanation for patient's symptoms.      Incidental findings appear stable from prior exam.      Workstation performed: [id number]             Procedures    ED Medication and Procedure Management   Prior to Admission Medications   Prescriptions Last Dose Informant Patient Reported? Taking?   methylprednisolone (MEDROL) 4 mg tablet   No No   Sig: Medrol dose pack Take as directed.   triamcinolone (KENALOG) 0.1 % cream   No No   Sig: Apply topically 2 (two) times a day      Facility-Administered Medications: None     Patient's Medications   Discharge Prescriptions    CEPHALEXIN (KEFLEX) 500 MG CAPSULE    Take 1 capsule (500 mg total) by mouth every 6 (six) hours for 10 days       Start Date: 11/6/2024 End Date: 11/16/2024       Order Dose: 500 mg       Quantity: 28 capsule    Refills: 0       ED SEPSIS DOCUMENTATION   Time reflects when diagnosis was documented in both MDM as applicable and the Disposition within this note       Time User Action Codes Description Comment    11/6/2024 10:09 PM Teofilo Neff [R10.9] Flank pain     11/6/2024 10:09 PM Teofilo Neff [R31.9]  Hematuria     11/6/2024 10:29 PM Teofilo Yi Add [N39.0] Urinary tract infection                  Teofilo Yi MD  11/06/24 8944

## 2024-11-07 ENCOUNTER — TELEPHONE (OUTPATIENT)
Age: 19
End: 2024-11-07

## 2024-11-07 LAB
C TRACH DNA SPEC QL NAA+PROBE: NEGATIVE
M GENITALIUM DNA SPEC QL NAA+PROBE: NEGATIVE
N GONORRHOEA DNA SPEC QL NAA+PROBE: NEGATIVE
T VAGINALIS DNA SPEC QL NAA+PROBE: NEGATIVE

## 2024-11-07 NOTE — TELEPHONE ENCOUNTER
Seen in the ER and given antibiotics for UTI. Called to schedule f/u. I asked him to finish his antibiotics and if he still had any pain or any symptoms we would try to move up his f/u appointment.

## 2024-11-07 NOTE — DISCHARGE INSTRUCTIONS
We assessed you for your abdominal pain.  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  It was noted that your urine had some leukocytes and white blood cells which can be indicative of a urinary tract infection and given your symptoms we will treat you with Keflex in which you will take for the next 10 days.  We are also giving you a urology follow-up and referral, please follow-up with urology and your primary care provider for continued management.

## 2024-11-08 LAB — BACTERIA UR CULT: NORMAL

## 2025-01-14 ENCOUNTER — OFFICE VISIT (OUTPATIENT)
Dept: UROLOGY | Facility: CLINIC | Age: 20
End: 2025-01-14
Payer: COMMERCIAL

## 2025-01-14 VITALS
SYSTOLIC BLOOD PRESSURE: 140 MMHG | BODY MASS INDEX: 39.17 KG/M2 | OXYGEN SATURATION: 98 % | HEIGHT: 75 IN | DIASTOLIC BLOOD PRESSURE: 94 MMHG | WEIGHT: 315 LBS | HEART RATE: 83 BPM

## 2025-01-14 DIAGNOSIS — R10.9 FLANK PAIN: ICD-10-CM

## 2025-01-14 DIAGNOSIS — R31.0 GROSS HEMATURIA: Primary | ICD-10-CM

## 2025-01-14 LAB — POST-VOID RESIDUAL VOLUME, ML POC: 45 ML

## 2025-01-14 PROCEDURE — 51798 US URINE CAPACITY MEASURE: CPT | Performed by: PHYSICIAN ASSISTANT

## 2025-01-14 PROCEDURE — 99213 OFFICE O/P EST LOW 20 MIN: CPT | Performed by: PHYSICIAN ASSISTANT

## 2025-01-14 NOTE — PROGRESS NOTES
"  UROLOGY PROGRESS NOTE   Patient Identifiers: Williams Calvo (MRN [id number])  Date of Service: 1/14/2025    Subjective:   19-year-old male history of chronic urethral stricture disease and hematuria.  He has not had urinary infections his cultures have been negative.  He was in the emergency room in November with gross hematuria.  He has a long history of dysuria likely related to his stricture disease problem.  He was placed on antibiotics.  This did not make much of an improvement in his dysuria.  He turned 19 in August and noted worsening symptoms after drinking alcohol.  His CT with contrast again was unremarkable.    Reason for visit: Urethral stricture and hematuria follow-up    Objective:     VITALS:    Vitals:    01/14/25 0847   BP: 140/94   Pulse: 83   SpO2: 98%           LABS:  Lab Results   Component Value Date    HGB 13.3 11/06/2024    HCT 41.6 11/06/2024    WBC 8.64 11/06/2024     11/06/2024   ]    Lab Results   Component Value Date    K 4.2 11/06/2024     11/06/2024    CO2 26 11/06/2024    BUN 15 11/06/2024    CREATININE 0.82 11/06/2024    CALCIUM 9.4 11/06/2024   ]        INPATIENT MEDS:    Current Outpatient Medications:     methylprednisolone (MEDROL) 4 mg tablet, Medrol dose pack Take as directed. (Patient not taking: Reported on 1/14/2025), Disp: 21 tablet, Rfl: 0    triamcinolone (KENALOG) 0.1 % cream, Apply topically 2 (two) times a day (Patient not taking: Reported on 1/14/2025), Disp: 5 g, Rfl: 1      Physical Exam:   /94 (BP Location: Left arm, Patient Position: Sitting, Cuff Size: Large)   Pulse 83   Ht 6' 3\" (1.905 m)   Wt (!) 155 kg (342 lb)   SpO2 98%   BMI 42.75 kg/m²   GEN: no acute distress    RESP: breathing comfortably with no accessory muscle use    ABD: soft, non-tender, non-distended   INCISION:    EXT: no significant peripheral edema       RADIOLOGY:   IMPRESSION:  No CT explanation for patient's symptoms.     Incidental findings appear stable from prior " exam     Assessment:   #1.  Gross hematuria  2.  Urethral stricture disease    Plan:   -His symptoms are stable  -Will follow-up in 6 months with urine microscopy prior to visit  -Consider follow-up cystoscopy later this year maybe in the fall  -

## 2025-01-22 ENCOUNTER — RA CDI HCC (OUTPATIENT)
Dept: OTHER | Facility: HOSPITAL | Age: 20
End: 2025-01-22

## 2025-01-22 PROBLEM — E66.01 MORBID OBESITY WITH BMI OF 40.0-44.9, ADULT (HCC): Status: ACTIVE | Noted: 2025-01-22

## 2025-01-22 PROBLEM — Z00.00 ANNUAL PHYSICAL EXAM: Status: RESOLVED | Noted: 2024-01-29 | Resolved: 2025-01-22

## 2025-01-22 NOTE — PROGRESS NOTES
HCC coding opportunities          Chart Reviewed number of suggestions sent to Provider: 1   E66.01    This is a reminder to address (resolve/update/assess) ALL HCC (risk adjustment) codes as found on active problem list for 2025 as patient scores reset to zero PATT.  Patients Insurance        Commercial Insurance: [employer]

## 2025-02-12 ENCOUNTER — OFFICE VISIT (OUTPATIENT)
Dept: INTERNAL MEDICINE CLINIC | Facility: OTHER | Age: 20
End: 2025-02-12
Payer: COMMERCIAL

## 2025-02-12 VITALS
WEIGHT: 315 LBS | TEMPERATURE: 96.9 F | BODY MASS INDEX: 39.17 KG/M2 | SYSTOLIC BLOOD PRESSURE: 118 MMHG | HEART RATE: 83 BPM | HEIGHT: 75 IN | DIASTOLIC BLOOD PRESSURE: 84 MMHG | OXYGEN SATURATION: 97 %

## 2025-02-12 DIAGNOSIS — Z00.00 ANNUAL PHYSICAL EXAM: Primary | ICD-10-CM

## 2025-02-12 DIAGNOSIS — E66.01 MORBID OBESITY WITH BMI OF 40.0-44.9, ADULT (HCC): ICD-10-CM

## 2025-02-12 PROBLEM — R30.0 DYSURIA: Status: RESOLVED | Noted: 2023-10-27 | Resolved: 2025-02-12

## 2025-02-12 PROBLEM — R31.0 GROSS HEMATURIA: Status: RESOLVED | Noted: 2024-01-29 | Resolved: 2025-02-12

## 2025-02-12 PROCEDURE — 99395 PREV VISIT EST AGE 18-39: CPT | Performed by: INTERNAL MEDICINE

## 2025-02-12 NOTE — ASSESSMENT & PLAN NOTE
Discussed diet and exercise.  Recommend that he follow a low calorie and low carbohydrate diet, increase protein intake.  He would like to defer on STD screening at this time.  Discussed safe sex practice.  Discussed skin cancer screening.

## 2025-02-12 NOTE — PROGRESS NOTES
Adult Annual Physical  Name: Williams Calvo      : 2005      MRN: [id number]  Encounter Provider: Chi Rios MD  Encounter Date: 2025   Encounter department: [facility] [place]    Assessment & Plan  Annual physical exam  Discussed diet and exercise.  Recommend that he follow a low calorie and low carbohydrate diet, increase protein intake.  He would like to defer on STD screening at this time.  Discussed safe sex practice.  Discussed skin cancer screening.       Morbid obesity with BMI of 40.0-44.9, adult (HCC)         Immunizations and preventive care screenings were discussed with patient today. Appropriate education was printed on patient's after visit summary.    Counseling:  Alcohol/drug use: discussed moderation in alcohol intake, the recommendations for healthy alcohol use, and avoidance of illicit drug use.  Dental Health: discussed importance of regular tooth brushing, flossing, and dental visits.  Injury prevention: discussed safety/seat belts, safety helmets, smoke detectors, carbon monoxide detectors, and smoking near bedding or upholstery.  Sexual health: discussed sexually transmitted diseases, partner selection, use of condoms, avoidance of unintended pregnancy, and contraceptive alternatives.  Exercise: the importance of regular exercise/physical activity was discussed. Recommend exercise 3-5 times per week for at least 30 minutes.          History of Present Illness     Adult Annual Physical:  Patient presents for annual physical.     Diet and Physical Activity:  - Diet/Nutrition: well balanced diet, consuming 3-5 servings of fruits/vegetables daily, adequate fiber intake and adequate whole grain intake. Since 2024  - Exercise: moderate cardiovascular exercise, 3-4 times a week on average and 5-7 times a week on average. Since 2024    Depression Screening:  - PHQ-2 Score: 0    General Health:  - Sleep: sleeps well and 4-6 hours of  "sleep on average.  - Hearing: normal hearing bilateral ears.  - Vision: no vision problems.  - Dental: brushes teeth twice daily and no dental visits for > 1 year.     Health:  - History of STDs: no.   - Urinary symptoms: none.     Review of Systems   Constitutional:  Negative for activity change, appetite change, chills, diaphoresis, fatigue and fever.   HENT:  Negative for congestion, postnasal drip, rhinorrhea, sinus pressure, sinus pain, sneezing and sore throat.    Eyes:  Negative for visual disturbance.   Respiratory:  Negative for apnea, cough, choking, chest tightness, shortness of breath and wheezing.    Cardiovascular:  Negative for chest pain, palpitations and leg swelling.   Gastrointestinal:  Negative for abdominal distention, abdominal pain, anal bleeding, blood in stool, constipation, diarrhea, nausea and vomiting.   Endocrine: Negative for cold intolerance and heat intolerance.   Genitourinary:  Negative for difficulty urinating, dysuria and hematuria.   Musculoskeletal: Negative.    Skin: Negative.    Neurological:  Negative for dizziness, weakness, light-headedness, numbness and headaches.   Hematological:  Negative for adenopathy.   Psychiatric/Behavioral:  Negative for agitation, sleep disturbance and suicidal ideas.    All other systems reviewed and are negative.        Objective   /84 (Patient Position: Sitting, Cuff Size: Adult)   Pulse 83   Temp (!) 96.9 °F (36.1 °C) (Tympanic)   Ht 6' 3\" (1.905 m)   Wt (!) 157 kg (347 lb 3.2 oz)   SpO2 97%   BMI 43.40 kg/m²     Physical Exam  Vitals and nursing note reviewed.   Constitutional:       General: He is not in acute distress.     Appearance: He is well-developed. He is not diaphoretic.   HENT:      Head: Normocephalic and atraumatic.   Eyes:      General: No scleral icterus.        Right eye: No discharge.         Left eye: No discharge.      Conjunctiva/sclera: Conjunctivae normal.      Pupils: Pupils are equal, round, and reactive " to light.   Neck:      Thyroid: No thyromegaly.      Vascular: No JVD.   Cardiovascular:      Rate and Rhythm: Normal rate and regular rhythm.      Heart sounds: Normal heart sounds. No murmur heard.     No friction rub. No gallop.   Pulmonary:      Effort: Pulmonary effort is normal. No respiratory distress.      Breath sounds: Normal breath sounds. No wheezing or rales.   Chest:      Chest wall: No tenderness.   Abdominal:      General: Bowel sounds are normal. There is no distension.      Palpations: Abdomen is soft. There is no mass.      Tenderness: There is no abdominal tenderness. There is no guarding or rebound.   Musculoskeletal:         General: No tenderness or deformity. Normal range of motion.      Cervical back: Normal range of motion and neck supple.   Lymphadenopathy:      Cervical: No cervical adenopathy.   Skin:     General: Skin is warm and dry.      Coloration: Skin is not pale.      Findings: No erythema or rash.   Neurological:      Mental Status: He is alert and oriented to person, place, and time.      Cranial Nerves: No cranial nerve deficit.      Coordination: Coordination normal.      Deep Tendon Reflexes: Reflexes are normal and symmetric.   Psychiatric:         Behavior: Behavior normal.         Thought Content: Thought content normal.         Judgment: Judgment normal.

## 2025-02-12 NOTE — PATIENT INSTRUCTIONS
"Patient Education     Routine physical for adults   The Basics   Written by the doctors and editors at [facility]   What is a physical? -- A physical is a routine visit, or \"check-up,\" with your doctor. You might also hear it called a \"wellness visit\" or \"preventive visit.\"  During each visit, the doctor will:   Ask about your physical and mental health   Ask about your habits, behaviors, and lifestyle   Do an exam   Give you vaccines if needed   Talk to you about any medicines you take   Give advice about your health   Answer your questions  Getting regular check-ups is an important part of taking care of your health. It can help your doctor find and treat any problems you have. But it's also important for preventing health problems.  A routine physical is different from a \"sick visit.\" A sick visit is when you see a doctor because of a health concern or problem. Since physicals are scheduled ahead of time, you can think about what you want to ask the doctor.  How often should I get a physical? -- It depends on your age and health. In general, for people age 21 years and older:   If you are younger than 50 years, you might be able to get a physical every 3 years.   If you are 50 years or older, your doctor might recommend a physical every year.  If you have an ongoing health condition, like diabetes or high blood pressure, your doctor will probably want to see you more often.  What happens during a physical? -- In general, each visit will include:   Physical exam - The doctor or nurse will check your height, weight, heart rate, and blood pressure. They will also look at your eyes and ears. They will ask about how you are feeling and whether you have any symptoms that bother you.   Medicines - It's a good idea to bring a list of all the medicines you take to each doctor visit. Your doctor will talk to you about your medicines and answer any questions. Tell them if you are having any side effects that bother you. You " "should also tell them if you are having trouble paying for any of your medicines.   Habits and behaviors - This includes:   Your diet   Your exercise habits   Whether you smoke, drink alcohol, or use drugs   Whether you are sexually active   Whether you feel safe at home  Your doctor will talk to you about things you can do to improve your health and lower your risk of health problems. They will also offer help and support. For example, if you want to quit smoking, they can give you advice and might prescribe medicines. If you want to improve your diet or get more physical activity, they can help you with this, too.   Lab tests, if needed - The tests you get will depend on your age and situation. For example, your doctor might want to check your:   Cholesterol   Blood sugar   Iron level   Vaccines - The recommended vaccines will depend on your age, health, and what vaccines you already had. Vaccines are very important because they can prevent certain serious or deadly infections.   Discussion of screening - \"Screening\" means checking for diseases or other health problems before they cause symptoms. Your doctor can recommend screening based on your age, risk, and preferences. This might include tests to check for:   Cancer, such as breast, prostate, cervical, ovarian, colorectal, prostate, lung, or skin cancer   Sexually transmitted infections, such as chlamydia and gonorrhea   Mental health conditions like depression and anxiety  Your doctor will talk to you about the different types of screening tests. They can help you decide which screenings to have. They can also explain what the results might mean.   Answering questions - The physical is a good time to ask the doctor or nurse questions about your health. If needed, they can refer you to other doctors or specialists, too.  Adults older than 65 years often need other care, too. As you get older, your doctor will talk to you about:   How to prevent falling at " home   Hearing or vision tests   Memory testing   How to take your medicines safely   Making sure that you have the help and support you need at home  All topics are updated as new evidence becomes available and our peer review process is complete.  This topic retrieved from [employer] on: May 02, 2024.  Topic 711807 Version 1.0  Release: 32.4.3 - C32.122  © 2024 UpToDate, Inc. and/or its affiliates. All rights reserved.  Consumer Information Use and Disclaimer   Disclaimer: This generalized information is a limited summary of diagnosis, treatment, and/or medication information. It is not meant to be comprehensive and should be used as a tool to help the user understand and/or assess potential diagnostic and treatment options. It does NOT include all information about conditions, treatments, medications, side effects, or risks that may apply to a specific patient. It is not intended to be medical advice or a substitute for the medical advice, diagnosis, or treatment of a health care provider based on the health care provider's examination and assessment of a patient's specific and unique circumstances. Patients must speak with a health care provider for complete information about their health, medical questions, and treatment options, including any risks or benefits regarding use of medications. This information does not endorse any treatments or medications as safe, effective, or approved for treating a specific patient. Up[employer]Date, Inc. and its affiliates disclaim any warranty or liability relating to this information or the use thereof.The use of this information is governed by the Terms of Use, available at https://www.[employer].com/en/know/clinical-effectiveness-terms. 2024© UpToDate, Inc. and its affiliates and/or licensors. All rights reserved.  Copyright   © 2024 UpToDate, Inc. and/or its affiliates. All rights reserved.

## 2025-04-03 ENCOUNTER — APPOINTMENT (OUTPATIENT)
Dept: URGENT CARE | Age: 20
End: 2025-04-03

## 2025-06-07 DIAGNOSIS — L23.7 POISON IVY DERMATITIS: ICD-10-CM

## 2025-06-07 RX ORDER — TRIAMCINOLONE ACETONIDE 1 MG/G
CREAM TOPICAL 2 TIMES DAILY
Qty: 5 G | Refills: 0 | OUTPATIENT
Start: 2025-06-07

## 2025-06-09 ENCOUNTER — OFFICE VISIT (OUTPATIENT)
Dept: URGENT CARE | Age: 20
End: 2025-06-09
Payer: COMMERCIAL

## 2025-06-09 VITALS
TEMPERATURE: 98 F | DIASTOLIC BLOOD PRESSURE: 88 MMHG | OXYGEN SATURATION: 99 % | HEART RATE: 108 BPM | RESPIRATION RATE: 18 BRPM | SYSTOLIC BLOOD PRESSURE: 138 MMHG

## 2025-06-09 DIAGNOSIS — S61.209A OPEN WOUND OF FINGER, INITIAL ENCOUNTER: ICD-10-CM

## 2025-06-09 DIAGNOSIS — L23.7 POISON IVY DERMATITIS: Primary | ICD-10-CM

## 2025-06-09 PROCEDURE — S9083 URGENT CARE CENTER GLOBAL: HCPCS | Performed by: STUDENT IN AN ORGANIZED HEALTH CARE EDUCATION/TRAINING PROGRAM

## 2025-06-09 PROCEDURE — G0382 LEV 3 HOSP TYPE B ED VISIT: HCPCS | Performed by: STUDENT IN AN ORGANIZED HEALTH CARE EDUCATION/TRAINING PROGRAM

## 2025-06-09 RX ORDER — PREDNISONE 10 MG/1
TABLET ORAL
Qty: 30 TABLET | Refills: 0 | Status: SHIPPED | OUTPATIENT
Start: 2025-06-09 | End: 2025-06-21

## 2025-06-09 RX ORDER — MUPIROCIN 20 MG/G
OINTMENT TOPICAL 2 TIMES DAILY
Qty: 22 G | Refills: 0 | Status: SHIPPED | OUTPATIENT
Start: 2025-06-09

## 2025-06-09 NOTE — PROGRESS NOTES
[facility]        NAME: Williams Calvo is a 19 y.o. male  : 2005    MRN: [id number]  DATE: 2025  TIME: 7:43 PM    Assessment and Plan   Poison ivy dermatitis [L23.7]  1. Poison ivy dermatitis  predniSONE 10 mg tablet      2. Open wound of finger, initial encounter  mupirocin (BACTROBAN) 2 % ointment            Patient Instructions   Please take steroid taper as prescribed to help calm down the inflammation from the rash.  It is helpful to take it in the morning with breakfast to help reduce side effect such as upset stomach or insomnia.    It is also important to take good care of the open areas on the fingers, I am prescribing an antibiotic ointment to use over this area, please wash with soap and water at least once daily, pat dry, then apply the antibiotic ointment and a clean and dry dressing.  If your noticing that the area is getting moist or dirty at work, please change to a new dry dressing as staying in a moist environment increases chance of infection.  If your symptoms are not adequately improving with the above, please follow-up with your PCP for recheck.      Follow up with PCP in 3-5 days.  Proceed to  ER if symptoms worsen.    If tests have been performed at [facility], our office will contact you with results if changes need to be made to the care plan discussed with you at the visit.  You can review your full results on [facility].    Chief Complaint     Chief Complaint   Patient presents with    Rash     Poison rash on hands and genitals that started two weeks ago. Was using a cream that he had from last year that started to help but is now spreading.          History of Present Illness       Patient presents with itchy rash.  He was working in an environment around poison ivy, started noticed a rash on his fingers, initially he was using previous prescription for triamcinolone which was somewhat helpful but then he noticed that the rash was worsening with opening of  the blisters on the fingers causing wounds as well as he noticed same rash near the genital area.  He feels that this is the same as his previous encounters with poison ivy and he frequently has the blisters and opening blisters.    Rash        Review of Systems   Review of Systems   Skin:  Positive for rash.   All other systems reviewed and are negative.        Current Medications     Current Medications[1]    Current Allergies     Allergies as of 06/09/2025    (No Known Allergies)            The following portions of the patient's history were reviewed and updated as appropriate: allergies, current medications, past family history, past medical history, past social history, past surgical history and problem list.     Past Medical History[2]    Past Surgical History[3]    Family History[4]      Medications have been verified.        Objective   /88   Pulse (!) 108   Temp 98 °F (36.7 °C)   Resp 18   SpO2 99%   No LMP for male patient.       Physical Exam     Physical Exam  Vitals and nursing note reviewed.   Constitutional:       Appearance: He is not ill-appearing, toxic-appearing or diaphoretic.   HENT:      Head: Normocephalic and atraumatic.      Right Ear: External ear normal.      Left Ear: External ear normal.      Nose: Nose normal.     Eyes:      Extraocular Movements: Extraocular movements intact.      Conjunctiva/sclera: Conjunctivae normal.     Genitourinary:     Comments: Politely declines exam of rash to genital region    Skin:     General: Skin is warm and dry.      Findings: Rash present.      Comments: Linear blistering rash to bilateral fingers, between left fingers 3 and 4 he has approximately 1 cm deroofed blisters, no surrounding erythema, however the surrounding skin is macerated/moist     Neurological:      Mental Status: He is alert.     Psychiatric:         Mood and Affect: Mood normal.                          [1]   Current Outpatient Medications:     mupirocin (BACTROBAN) 2 %  ointment, Apply topically 2 (two) times a day, Disp: 22 g, Rfl: 0    predniSONE 10 mg tablet, Take 4 tablets (40 mg total) by mouth daily for 3 days, THEN 3 tablets (30 mg total) daily for 3 days, THEN 2 tablets (20 mg total) daily for 3 days, THEN 1 tablet (10 mg total) daily for 3 days., Disp: 30 tablet, Rfl: 0    triamcinolone (KENALOG) 0.1 % cream, Apply topically 2 (two) times a day (Patient not taking: Reported on 1/14/2025), Disp: 5 g, Rfl: 1  [2]   Past Medical History:  Diagnosis Date    Annual physical exam 01/29/2024    Closed right ankle fracture 2017   [3] No past surgical history on file.  [4]   Family History  Problem Relation Name Age of Onset    Diabetes Father      No Known Problems Mother      No Known Problems Sister      No Known Problems Sister

## 2025-06-09 NOTE — PATIENT INSTRUCTIONS
Please take steroid taper as prescribed to help calm down the inflammation from the rash.  It is helpful to take it in the morning with breakfast to help reduce side effect such as upset stomach or insomnia.    It is also important to take good care of the open areas on the fingers, I am prescribing an antibiotic ointment to use over this area, please wash with soap and water at least once daily, pat dry, then apply the antibiotic ointment and a clean and dry dressing.  If your noticing that the area is getting moist or dirty at work, please change to a new dry dressing as staying in a moist environment increases chance of infection.  If your symptoms are not adequately improving with the above, please follow-up with your PCP for recheck.

## 2025-07-15 ENCOUNTER — OFFICE VISIT (OUTPATIENT)
Dept: UROLOGY | Facility: CLINIC | Age: 20
End: 2025-07-15
Payer: COMMERCIAL

## 2025-07-15 VITALS
SYSTOLIC BLOOD PRESSURE: 126 MMHG | HEIGHT: 75 IN | BODY MASS INDEX: 39.17 KG/M2 | HEART RATE: 77 BPM | WEIGHT: 315 LBS | DIASTOLIC BLOOD PRESSURE: 80 MMHG | OXYGEN SATURATION: 99 %

## 2025-07-15 DIAGNOSIS — R31.0 GROSS HEMATURIA: Primary | ICD-10-CM

## 2025-07-15 PROCEDURE — 99213 OFFICE O/P EST LOW 20 MIN: CPT | Performed by: PHYSICIAN ASSISTANT

## 2025-08-01 ENCOUNTER — TELEMEDICINE (OUTPATIENT)
Dept: OTHER | Facility: HOSPITAL | Age: 20
End: 2025-08-01
Payer: COMMERCIAL

## 2025-08-01 DIAGNOSIS — L25.5 RHUS DERMATITIS: Primary | ICD-10-CM

## 2025-08-01 PROBLEM — Z00.00 ANNUAL PHYSICAL EXAM: Status: RESOLVED | Noted: 2024-01-29 | Resolved: 2025-08-01

## 2025-08-01 PROCEDURE — 99213 OFFICE O/P EST LOW 20 MIN: CPT | Performed by: PHYSICIAN ASSISTANT

## 2025-08-01 RX ORDER — PREDNISONE 10 MG/1
TABLET ORAL
Qty: 30 TABLET | Refills: 0 | Status: SHIPPED | OUTPATIENT
Start: 2025-08-01 | End: 2025-08-11